# Patient Record
Sex: FEMALE | Race: ASIAN | NOT HISPANIC OR LATINO
[De-identification: names, ages, dates, MRNs, and addresses within clinical notes are randomized per-mention and may not be internally consistent; named-entity substitution may affect disease eponyms.]

---

## 2018-07-17 ENCOUNTER — APPOINTMENT (OUTPATIENT)
Dept: DERMATOLOGY | Facility: CLINIC | Age: 68
End: 2018-07-17
Payer: MEDICARE

## 2018-07-17 VITALS
HEIGHT: 62 IN | DIASTOLIC BLOOD PRESSURE: 80 MMHG | SYSTOLIC BLOOD PRESSURE: 122 MMHG | BODY MASS INDEX: 20.24 KG/M2 | WEIGHT: 110 LBS

## 2018-07-17 DIAGNOSIS — L81.1 CHLOASMA: ICD-10-CM

## 2018-07-17 PROCEDURE — 99203 OFFICE O/P NEW LOW 30 MIN: CPT

## 2020-04-26 ENCOUNTER — MESSAGE (OUTPATIENT)
Age: 70
End: 2020-04-26

## 2021-03-10 ENCOUNTER — APPOINTMENT (OUTPATIENT)
Dept: INTERNAL MEDICINE | Facility: CLINIC | Age: 71
End: 2021-03-10
Payer: COMMERCIAL

## 2021-03-10 VITALS
SYSTOLIC BLOOD PRESSURE: 126 MMHG | TEMPERATURE: 97.8 F | BODY MASS INDEX: 20.98 KG/M2 | RESPIRATION RATE: 14 BRPM | HEIGHT: 62 IN | OXYGEN SATURATION: 98 % | HEART RATE: 81 BPM | WEIGHT: 114 LBS | DIASTOLIC BLOOD PRESSURE: 70 MMHG

## 2021-03-10 DIAGNOSIS — Z83.6 FAMILY HISTORY OF OTHER DISEASES OF THE RESPIRATORY SYSTEM: ICD-10-CM

## 2021-03-10 DIAGNOSIS — Z82.49 FAMILY HISTORY OF ISCHEMIC HEART DISEASE AND OTHER DISEASES OF THE CIRCULATORY SYSTEM: ICD-10-CM

## 2021-03-10 DIAGNOSIS — H40.20X0 UNSPECIFIED PRIMARY ANGLE-CLOSURE GLAUCOMA, STAGE UNSPECIFIED: ICD-10-CM

## 2021-03-10 DIAGNOSIS — Z23 ENCOUNTER FOR IMMUNIZATION: ICD-10-CM

## 2021-03-10 DIAGNOSIS — Z22.7 LATENT TUBERCULOSIS: ICD-10-CM

## 2021-03-10 DIAGNOSIS — Z78.9 OTHER SPECIFIED HEALTH STATUS: ICD-10-CM

## 2021-03-10 DIAGNOSIS — J30.2 OTHER SEASONAL ALLERGIC RHINITIS: ICD-10-CM

## 2021-03-10 DIAGNOSIS — Z86.2 PERSONAL HISTORY OF DISEASES OF THE BLOOD AND BLOOD-FORMING ORGANS AND CERTAIN DISORDERS INVOLVING THE IMMUNE MECHANISM: ICD-10-CM

## 2021-03-10 DIAGNOSIS — Z91.018 ALLERGY TO OTHER FOODS: ICD-10-CM

## 2021-03-10 PROCEDURE — 99072 ADDL SUPL MATRL&STAF TM PHE: CPT

## 2021-03-10 PROCEDURE — 99387 INIT PM E/M NEW PAT 65+ YRS: CPT | Mod: 25

## 2021-03-10 PROCEDURE — 90715 TDAP VACCINE 7 YRS/> IM: CPT

## 2021-03-10 PROCEDURE — 93000 ELECTROCARDIOGRAM COMPLETE: CPT | Mod: 59

## 2021-03-10 PROCEDURE — 90471 IMMUNIZATION ADMIN: CPT

## 2021-03-10 PROCEDURE — G0444 DEPRESSION SCREEN ANNUAL: CPT | Mod: NC,59

## 2021-03-10 PROCEDURE — 36415 COLL VENOUS BLD VENIPUNCTURE: CPT

## 2021-03-13 PROBLEM — Z86.2 HISTORY OF ANEMIA: Status: RESOLVED | Noted: 2021-03-10 | Resolved: 2021-03-13

## 2021-03-13 PROBLEM — Z82.49 FAMILY HISTORY OF HYPERTENSION: Status: ACTIVE | Noted: 2021-03-13

## 2021-03-13 PROBLEM — Z83.6 FAMILY HISTORY OF PULMONARY FIBROSIS: Status: ACTIVE | Noted: 2021-03-13

## 2021-03-13 PROBLEM — H40.20X0 GLAUCOMA, NARROW-ANGLE: Status: ACTIVE | Noted: 2021-03-13

## 2021-03-13 PROBLEM — J30.2 SEASONAL ALLERGIES: Status: ACTIVE | Noted: 2021-03-10

## 2021-03-13 PROBLEM — Z91.018 FOOD ALLERGY: Status: ACTIVE | Noted: 2021-03-10

## 2021-03-13 PROBLEM — Z22.7 LATENT TUBERCULOSIS: Status: RESOLVED | Noted: 2021-03-13 | Resolved: 2021-03-13

## 2021-03-13 RX ORDER — TIMOLOL MALEATE 5 MG/ML
0.5 SOLUTION OPHTHALMIC
Refills: 0 | Status: ACTIVE | COMMUNITY

## 2021-03-13 RX ORDER — MULTIVITAMIN
TABLET ORAL
Refills: 0 | Status: ACTIVE | COMMUNITY

## 2021-03-13 RX ORDER — VITAMIN K2 90 MCG
125 MCG CAPSULE ORAL
Refills: 0 | Status: ACTIVE | COMMUNITY

## 2021-03-13 RX ORDER — HYDROQUINONE 40 MG/G
4 CREAM TOPICAL
Qty: 1 | Refills: 2 | Status: DISCONTINUED | COMMUNITY
Start: 2018-07-17 | End: 2021-03-13

## 2021-03-13 NOTE — PAST MEDICAL HISTORY
[Menopause Age____] : age at menopause was [unfilled] [Total Preg ___] : G[unfilled] [Living ___] : Living: [unfilled] [AB Spont ___] : miscarriages: [unfilled]  [Postmenopausal] : postmenopausal

## 2021-03-13 NOTE — HEALTH RISK ASSESSMENT
[Patient reported colonoscopy was normal] : Patient reported colonoscopy was normal [Patient reported mammogram was normal] : Patient reported mammogram was normal [Patient reported PAP Smear was normal] : Patient reported PAP Smear was normal [No falls in past year] : Patient reported no falls in the past year [Patient declined bone density test] : Patient declined bone density test [HIV test declined] : HIV test declined [Hepatitis C test offered] : Hepatitis C test offered [Fully functional (bathing, dressing, toileting, transferring, walking, feeding)] : Fully functional (bathing, dressing, toileting, transferring, walking, feeding) [Fully functional (using the telephone, shopping, preparing meals, housekeeping, doing laundry, using] : Fully functional and needs no help or supervision to perform IADLs (using the telephone, shopping, preparing meals, housekeeping, doing laundry, using transportation, managing medications and managing finances) [Smoke Detector] : smoke detector [Carbon Monoxide Detector] : carbon monoxide detector [Seat Belt] :  uses seat belt [Sunscreen] : uses sunscreen [0] : 2) Feeling down, depressed, or hopeless: Not at all (0) [[id number]] : 0 [Guns at Home] : no guns at home [MammogramDate] : 01/2019 [PapSmearDate] : 01/2010 [BoneDensityDate] : > 10 yrs [ColonoscopyDate] : 05/19 [ColonoscopyComments] : told nl, repeat in 2 yrs  [AdvancecareDate] : 03/21

## 2021-03-13 NOTE — ASSESSMENT
[FreeTextEntry1] : \par 72 yo F pmhx HTN, HLD, hypothyroidism, regional ileitis, anemia, hx LTBI s/p INH tx course, food allergy, narrow angle glaucoma here to establish care/CPE\par \par HTN- BP wnl, asx\par -hx negative screening stress test (? 2017) per pt\par -screening EKG: NSR @ 66 bpm, nl axis, no LVH/path Q/ST chgs - to be scanned into chart\par -on enalapril 20 mg qd\par -followed by optho, yearly screening advised\par -low salt diet, exercise advised\par -asked to forward record\par -check cmp, screening UA\par \par HLD-\par -hx d/c Crestor 2/2 muscle weakness, resolved off Rx (denies hx other statins in past)\par -willing to consider alternate statin trial after repeat labs \par -low fast diet, exercise advised\par -check lipids\par \par hypothyroidism-\par -on Levothyroxine 50mcg\par -check TSH\par \par hx right sided regional ileitis, dx'd in 2006- told healed since, on Mesalamine qd\par -followed by GI, Dr. Russo, states awaiting c-cope 5/21 which gets q 2 yrs with EGD per pt\par -asked to forward records\par \par hx narrow angle glaucoma- hx b/l laser surgery ~4 yrs ago\par -followed optho, last seen 3/20- told doing well and has f/u 3/18/21\par -on Timolol gtt\par \par hx food/seasonal allergies, hx cough in cold air- \par -A/I referral for eval and r/o asthma\par -declines epi pen\par -cont to avoid apples/pears/nuts advised\par \par hx +PPD 6/02 s/p INH tx x 6 mo\par -[place]\par -reports last cxr in 2018 for work screening, told showed calcification- seen by pulm, told c/w scarring from prior TB and no intervention needed\par \par \par MISC:  Continued social distancing and measure for covid19 prevention encouraged. \par -hx covid vaccine (pfizer), completed 2/13/21 \par \par \par \par HCM\par -check screening labs; declines STD/HIV screening\par -check hep C screening\par -hx flu shot 10/20\par -Tdap booster today\par -hx PVX, plans to forward record to verify date\par -hx hep B series\par -immune to MMR/Varicella and hep B per 6/02 labs- to be scanned into chart\par -hx Shingrix #1 in 2/21, plans to get #2 with EHS\par -hx negative PAP 2010 by GYN per pt, referral given per request\par -hx negative mammo 2019, Rx for repeat given\par -hx nl DEXA > 10 yrs ago per pt, declines repeat \par -yearly derm skin screening exam advised.  Regular use of sun block for skin cancer prevention advised.\par -yearly dental screening advised\par -advised to designate HCP and provide copy of completed form for records\par \par Pt's cell: [phone]\par \par Labs drawn in office today.\par

## 2021-03-13 NOTE — PHYSICAL EXAM
[No Acute Distress] : no acute distress [Well-Appearing] : well-appearing [Normal Sclera/Conjunctiva] : normal sclera/conjunctiva [PERRL] : pupils equal round and reactive to light [EOMI] : extraocular movements intact [Normal Outer Ear/Nose] : the outer ears and nose were normal in appearance [Normal Oropharynx] : the oropharynx was normal [Normal TMs] : both tympanic membranes were normal [Normal Nasal Mucosa] : the nasal mucosa was normal [No Lymphadenopathy] : no lymphadenopathy [Supple] : supple [Thyroid Normal, No Nodules] : the thyroid was normal and there were no nodules present [No Respiratory Distress] : no respiratory distress  [Clear to Auscultation] : lungs were clear to auscultation bilaterally [Normal Rate] : normal rate  [Regular Rhythm] : with a regular rhythm [Normal S1, S2] : normal S1 and S2 [No Murmur] : no murmur heard [Pedal Pulses Present] : the pedal pulses are present [No Edema] : there was no peripheral edema [Soft] : abdomen soft [Non Tender] : non-tender [No HSM] : no HSM [Normal Supraclavicular Nodes] : no supraclavicular lymphadenopathy [Normal Posterior Cervical Nodes] : no posterior cervical lymphadenopathy [Normal Anterior Cervical Nodes] : no anterior cervical lymphadenopathy [No CVA Tenderness] : no CVA  tenderness [No Spinal Tenderness] : no spinal tenderness [No Joint Swelling] : no joint swelling [Grossly Normal Strength/Tone] : grossly normal strength/tone [No Rash] : no rash [No Focal Deficits] : no focal deficits [Normal Gait] : normal gait [Normal Affect] : the affect was normal [Alert and Oriented x3] : oriented to person, place, and time [de-identified] : scattered freckles

## 2021-03-13 NOTE — HISTORY OF PRESENT ILLNESS
[Health Maintenance] : health maintenance [Spouse] : spouse [] :  [Working Part-Time] : working part-time [Occupation ___] : occupation: [unfilled] [Never Smoked Cigarettes] : has never smoked cigarettes [Never] : has never used illicit drugs [Good] : good [Reg. Dental Visits] : She has regular dental visits [Postmenopausal] : the patient is postmenopausal [Healthy Diet] : She consumes a diverse and healthy diet [Regular Exercise] : She exercises regularly [FreeTextEntry1] : \par Needs PMD. [Vision Problems] : She denies vision problems [Hearing Loss] : She denies hearing loss [de-identified] : 2019 by prior PMD [de-identified] : hx flu shot 10/20, hx Tdap 2011, hx PVX - unsure of date received, hx hep B series, hx Zostavax 2016, hx shingrix #1 in 2/21 [de-identified] : \par Feeling well.\par Last at 4 hrs ago- steamed eleazar, wants labs today\par \par \par Denies ER or hospitalizations since last CPE.\par Reports is socially distancing and using precautions for covid prevention.\par Denies sick or covid positive contacts.\par Denies fever, chills, cough or sob.\par -hx covid vaccine (pfizer), completed 2/13/21\par \par hx food allergy- avoids apples/pears/nuts\par -has coughing on/off when in cold air, using MDI prn with help- used 2x/mo.  Denies hx pulm eval for this or PFTs to r/o asthma.\par -hx idiopathic pulmonary fibrosis in mother\par -denies hx epi-pen use, none at home- declines Rx\par \par hx right sided regional ileitis, dx'd in 2006- told healed since, on Mesalamine qd\par -followed by GI, Dr. Russo, states awaiting c-cope 5/21 which gets q 2 yrs with EGD \par \par HTN- dx'd in 1995\par -on enalapril 20 mg qd, taking this dose x 2 yrs\par -denies HA, dizziness, CP or sob\par \par HLD-\par -reports hx Crestor 10 mg qd per PMD (also cardiologist) taken since 2019- states self d/c'd 3 mo ago due to muscle weakness was feeling during yoga- notes resolved since off\par -denies hx prior statin use, hx CAD or CVA\par -reports hx negative screening stress test in past (? 4 yrs ago)\par \par Reports is vegetarian, eating healthy.  Notes stable weight in past year.\par exercise: yoga daily \par \par hypothyroidism- dx'd on routine labs 5 yrs ago\par -on levothyroxine 50 mcg, taking this dose since dx\par -last TSH checked 2019, states calls in own refills prn\par \par hx narrow angle glaucoma- hx b/l laser surgery ~4 yrs ago\par -followed optho, last seen 3/20- told doing well and has f/u 3/18/21\par -on Timolol gtt\par -denies eye pain or vision trouble\par \par hx +PPD 6/02 s/p INH tx  x 6 mo\par -[place]\par -reports last cxr in 2018 for work screening, told showed calcification- seen by pulm, told c/w scarring from prior TB and no intervention needed\par \par Denies Depression or anxiety.

## 2021-03-14 LAB
25(OH)D3 SERPL-MCNC: 38.3 NG/ML
ALBUMIN SERPL ELPH-MCNC: 4.5 G/DL
ALP BLD-CCNC: 43 U/L
ALT SERPL-CCNC: 14 U/L
ANION GAP SERPL CALC-SCNC: 10 MMOL/L
APPEARANCE: CLEAR
AST SERPL-CCNC: 20 U/L
BACTERIA: NEGATIVE
BASOPHILS # BLD AUTO: 0.06 K/UL
BASOPHILS NFR BLD AUTO: 0.8 %
BILIRUB SERPL-MCNC: 0.2 MG/DL
BILIRUBIN URINE: NEGATIVE
BLOOD URINE: NEGATIVE
BUN SERPL-MCNC: 19 MG/DL
CALCIUM SERPL-MCNC: 10.2 MG/DL
CHLORIDE SERPL-SCNC: 101 MMOL/L
CHOLEST SERPL-MCNC: 226 MG/DL
CO2 SERPL-SCNC: 27 MMOL/L
COLOR: YELLOW
CREAT SERPL-MCNC: 0.78 MG/DL
CREAT SPEC-SCNC: 91 MG/DL
CREAT/PROT UR: 0.1 RATIO
EOSINOPHIL # BLD AUTO: 0.31 K/UL
EOSINOPHIL NFR BLD AUTO: 4 %
ESTIMATED AVERAGE GLUCOSE: 117 MG/DL
FOLATE SERPL-MCNC: >20 NG/ML
GLUCOSE QUALITATIVE U: NEGATIVE
GLUCOSE SERPL-MCNC: 89 MG/DL
HBA1C MFR BLD HPLC: 5.7 %
HBV CORE IGG+IGM SER QL: NONREACTIVE
HBV SURFACE AB SER QL: NONREACTIVE
HBV SURFACE AG SER QL: NONREACTIVE
HCT VFR BLD CALC: 38.8 %
HCV AB SER QL: NONREACTIVE
HCV S/CO RATIO: 0.08 S/CO
HDLC SERPL-MCNC: 58 MG/DL
HGB BLD-MCNC: 12.5 G/DL
HYALINE CASTS: 0 /LPF
IMM GRANULOCYTES NFR BLD AUTO: 0.3 %
KETONES URINE: NEGATIVE
LDLC SERPL CALC-MCNC: 139 MG/DL
LEUKOCYTE ESTERASE URINE: NEGATIVE
LYMPHOCYTES # BLD AUTO: 3.27 K/UL
LYMPHOCYTES NFR BLD AUTO: 42 %
MAN DIFF?: NORMAL
MCHC RBC-ENTMCNC: 30.3 PG
MCHC RBC-ENTMCNC: 32.2 GM/DL
MCV RBC AUTO: 94.2 FL
MICROSCOPIC-UA: NORMAL
MONOCYTES # BLD AUTO: 0.6 K/UL
MONOCYTES NFR BLD AUTO: 7.7 %
NEUTROPHILS # BLD AUTO: 3.53 K/UL
NEUTROPHILS NFR BLD AUTO: 45.2 %
NITRITE URINE: NEGATIVE
NONHDLC SERPL-MCNC: 168 MG/DL
PH URINE: 7.5
PLATELET # BLD AUTO: 343 K/UL
POTASSIUM SERPL-SCNC: 4.2 MMOL/L
PROT SERPL-MCNC: 7.2 G/DL
PROT UR-MCNC: 6 MG/DL
PROTEIN URINE: NORMAL
RBC # BLD: 4.12 M/UL
RBC # FLD: 12 %
RED BLOOD CELLS URINE: 2 /HPF
SODIUM SERPL-SCNC: 138 MMOL/L
SPECIFIC GRAVITY URINE: 1.02
SQUAMOUS EPITHELIAL CELLS: 2 /HPF
TRIGL SERPL-MCNC: 145 MG/DL
TSH SERPL-ACNC: 1.35 UIU/ML
UROBILINOGEN URINE: NORMAL
VIT B12 SERPL-MCNC: 608 PG/ML
WBC # FLD AUTO: 7.79 K/UL
WHITE BLOOD CELLS URINE: 4 /HPF

## 2021-04-08 ENCOUNTER — NON-APPOINTMENT (OUTPATIENT)
Age: 71
End: 2021-04-08

## 2021-04-08 DIAGNOSIS — R92.8 OTHER ABNORMAL AND INCONCLUSIVE FINDINGS ON DIAGNOSTIC IMAGING OF BREAST: ICD-10-CM

## 2021-04-19 ENCOUNTER — NON-APPOINTMENT (OUTPATIENT)
Age: 71
End: 2021-04-19

## 2021-06-11 ENCOUNTER — APPOINTMENT (OUTPATIENT)
Dept: INTERNAL MEDICINE | Facility: CLINIC | Age: 71
End: 2021-06-11

## 2021-07-22 ENCOUNTER — APPOINTMENT (OUTPATIENT)
Dept: PULMONOLOGY | Facility: CLINIC | Age: 71
End: 2021-07-22
Payer: MEDICARE

## 2021-07-22 VITALS
RESPIRATION RATE: 16 BRPM | WEIGHT: 114 LBS | DIASTOLIC BLOOD PRESSURE: 84 MMHG | TEMPERATURE: 97.9 F | SYSTOLIC BLOOD PRESSURE: 132 MMHG | HEIGHT: 62 IN | HEART RATE: 70 BPM | BODY MASS INDEX: 20.98 KG/M2

## 2021-07-22 PROCEDURE — 99205 OFFICE O/P NEW HI 60 MIN: CPT

## 2021-07-22 PROCEDURE — 99072 ADDL SUPL MATRL&STAF TM PHE: CPT

## 2021-07-22 NOTE — HISTORY OF PRESENT ILLNESS
[FreeTextEntry1] : 70 y/o F with PMH of HTN, HLD and hypothyroidism presented to the sleep clinic for an initial evaluation.\par \par Her main complaints is nocturnal awakenings and non-restorative sleep.  SHe is post-menopausal and noted her sleep issues began after menopause.  Notes three awakenings at night.  Dry mouth in the mornings.  Feels unrefreshed and has some residual EDS with an ESS of 6 on today's exam.  Denies significant changes to her weight. \par ____________________________________________________________________\par EPWORTH SLEEPINESS SCALE\par \par How likely are you to doze off or fall asleep in the situations described below, in contrast to feeling just tired?  \par This refers to your usual way of life in recent times.  \par Even if you haven't done some of these things recently, try to work out how they would have affected you.\par Use the following scale to choose one most appropriate number for each situation.\par \par Chance of dozing.............Situation\par .............2...........................Sitting and reading\par .............0...........................Watching TV\par .............0...........................Sitting inactive in a public place (eg a theatre or a meeting)\par .............2...........................As a passenger in a car for an hour without a break\par .............2...........................Lying down to rest in the afternoon when circumstances permit\par .............0...........................Sitting and talking to someone\par .............0...........................Sitting quietly after lunch without alcohol\par .............0...........................In a car, while stopped for a few minutes in traffic\par \par .............6...........................TOTAL SCORE\par \par 0 = Never would doze\par 1 = Slight chance of dozing\par 2 = Moderate chance of dozing\par 3 = High chance of dozing \par ______________________________________________________________________  [Snoring] : snoring [Frequent Nocturnal Awakening] : frequent nocturnal awakening [Unintentional Sleep While Inactive] : unintentional sleep while inactive [Awakes Unrefreshed] : awakening unrefreshed [Awakening With Dry Mouth] : awakening with dry mouth [Recent  Weight Gain] : no recent weight gain [ESS] : 6

## 2021-07-22 NOTE — REVIEW OF SYSTEMS
[EDS: ESS=____] : daytime somnolence: ESS=[unfilled] [Fatigue] : fatigue [A.M. Dry Mouth] : a.m. dry mouth [Frequent Nocturnal Awakenings] : frequent nocturnal awakenings from sleep [Daytime Somnolence: ESS=____] : daytime somnolence: ESS=[unfilled] [Unintentional Sleep while inactive] : unintentional sleep while inactive [Awakes Unrefreshed] : nonrestorative sleep [Awakes With Dry Mouth] : awakes with dry mouth [Negative] : Psychiatric

## 2021-07-22 NOTE — ASSESSMENT
[FreeTextEntry1] : 70 y/o F with PMH of HTN, HLD and hypothyroidism presented to the sleep clinic for an initial evaluation.  Her main sleep related complaint is nocturnal awakenings and non-restorative sleep.  She noted her sleep related issues occurred after menopause. She has around three awakenings at night.  Dry mouth in the mornings.  Feels unrefreshed and has some residual EDS with an ESS of 6 on today's exam.  Denies significant changes to her weight.   On exam, her oropharynx is crowded due to an enlarged base of tongue and low lying soft palate -- all of which increase risk of DAGMAR.  The patient was referred to the [facility] for diagnostic polysomnography for further assessment. The ramifications of obstructive sleep apnea and its potential therapeutic modalities were discussed with the patient. The dangers of drowsy driving were discussed with the patient.  The patient was warned to avoid drowsy driving. The patient will followup after results of this study are available.\par  \par Gurpreet Cheney, DO\par Pulmonary, Critical Care and Sleep Medicine Attending

## 2021-08-10 ENCOUNTER — APPOINTMENT (OUTPATIENT)
Dept: INTERNAL MEDICINE | Facility: CLINIC | Age: 71
End: 2021-08-10
Payer: MEDICARE

## 2021-08-10 VITALS
HEIGHT: 62 IN | SYSTOLIC BLOOD PRESSURE: 114 MMHG | BODY MASS INDEX: 20.61 KG/M2 | DIASTOLIC BLOOD PRESSURE: 72 MMHG | HEART RATE: 82 BPM | TEMPERATURE: 98.5 F | OXYGEN SATURATION: 98 % | WEIGHT: 112 LBS

## 2021-08-10 DIAGNOSIS — K50.00 CROHN'S DISEASE OF SMALL INTESTINE W/OUT COMPLICATIONS: ICD-10-CM

## 2021-08-10 PROCEDURE — 99214 OFFICE O/P EST MOD 30 MIN: CPT

## 2021-08-10 NOTE — ASSESSMENT
[FreeTextEntry1] : 70 yo F pmhx HTN, HLD, hypothyroidism, regional ileitis, anemia, hx LTBI s/p INH tx course, food allergy, narrow angle glaucoma here for f/u on ch medical conditions \par \par Insomnia\par - sleep hygiene reviewed \par -avoid electronics after 9 pm \par -limit caffein intake in evenings \par - could not tolerate melatonin \par -trial of ambien 5 mg qhs as needed only \par \par HTN- BP wnl, asx\par -hx negative screening stress test (? 2017) per pt\par -screening EKG: NSR @ 66 bpm, nl axis, no LVH/path Q/ST chgs - to be scanned into chart\par -on enalapril 20 mg qd\par -followed by optho, yearly screening advised\par -low salt diet, exercise advised\par -asked to forward record\par -check cmp, screening UA\par \par HLD-\par -on crestor 10 qhs \par -willing to consider alternate statin trial after repeat labs \par -low fast diet, exercise advised\par -check lipids fasting \par \par hypothyroidism-\par -on Levothyroxine 50mcg\par -check TSH\par \par hx right sided regional ileitis, dx'd in 2006- told healed since, on Mesalamine qd\par -followed by GI, Dr. 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 \par -hx covid vaccine (pfizer), completed 2/13/21 \par \par \par HCM\par declines STD/HIV screening\par  hep C screening 3/2021 neg \par  flu shot- 10/20\par Tdap booster- 3/2021 \par hx PVX, plans to forward record to verify date\par hx hep B series in past \par immune to MMR/Varicella and hep B per 6/02 labs- to be scanned into chart\par hx Shingrix #1 in 2/21, plans to get #2 with EHS\par hx negative PAP 2010 by GYN per pt, referral given per request\par mammo 4/6/21 bi rad 1 \par hx nl DEXA > 10 yrs ago per pt, declines repeat \par yearly derm skin screening exam advised. Regular use of sun block for skin cancer prevention advised.\par yearly dental screening advised\par advised to designate HCP and provide copy of completed form for records\par colonoscope 2021 - no polyps - hx colitis - recommended to stop mesalamine \par \par Pt's cell: [phone]\par \par  \par

## 2021-08-10 NOTE — HISTORY OF PRESENT ILLNESS
[de-identified] : transition of care from DR Tavarez - last visit 3/10/21 \par seen today for f/u on ch medical issues \par \par Working in [employer] part time [employer] \par \par c/o insomnia- saw sleep sp - recommended to do sleep specialist - \par -goes to bed 10:30 wakes up 1 :30 then restless sleep \par -took melatonin in past for 1 month dev abd discomfort so stopped \par \par hx food allergy- avoids apples/pears/nuts\par -has coughing on/off when in cold air, using MDI prn with help- used 2x/mo. Denies hx pulm eval for this or PFTs to r/o asthma.\par -hx idiopathic pulmonary fibrosis in mother\par -denies hx epi-pen use, none at home- declines Rx\par \par hx right sided regional ileitis, dx'd in 2006- told healed since, on Mesalamine qd\par -followed by GI, Dr. Russo, did c-cope 5/21 which gets q 2 yrs with EGD - recommended - to stop Mesalamine \par \par HTN- dx'd in 1995\par -on enalapril 20 mg qd, taking this dose x 2 yrs\par -denies HA, dizziness, CP or sob\par \par HLD-\par -reports hx Crestor 10 mg qd per PMD (also cardiologist) taken since 2019\par -denies hx prior statin use, hx CAD or CVA\par -reports hx negative screening stress test in past (? 4 yrs ago)\par \par Reports is vegetarian, eating healthy. 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 \par

## 2021-09-07 ENCOUNTER — NON-APPOINTMENT (OUTPATIENT)
Age: 71
End: 2021-09-07

## 2021-12-30 ENCOUNTER — APPOINTMENT (OUTPATIENT)
Dept: SLEEP CENTER | Facility: CLINIC | Age: 71
End: 2021-12-30

## 2022-02-17 ENCOUNTER — FORM ENCOUNTER (OUTPATIENT)
Age: 72
End: 2022-02-17

## 2022-02-18 ENCOUNTER — OUTPATIENT (OUTPATIENT)
Dept: OUTPATIENT SERVICES | Facility: HOSPITAL | Age: 72
LOS: 1 days | End: 2022-02-18
Payer: MEDICARE

## 2022-02-18 ENCOUNTER — APPOINTMENT (OUTPATIENT)
Dept: SLEEP CENTER | Facility: CLINIC | Age: 72
End: 2022-02-18
Payer: MEDICARE

## 2022-02-18 DIAGNOSIS — Z98.89 OTHER SPECIFIED POSTPROCEDURAL STATES: Chronic | ICD-10-CM

## 2022-02-18 PROCEDURE — 95806 SLEEP STUDY UNATT&RESP EFFT: CPT | Mod: 26

## 2022-02-18 PROCEDURE — 95806 SLEEP STUDY UNATT&RESP EFFT: CPT

## 2022-02-23 LAB
CHOLEST SERPL-MCNC: 260 MG/DL
HDLC SERPL-MCNC: 68 MG/DL
LDLC SERPL CALC-MCNC: 161 MG/DL
NONHDLC SERPL-MCNC: 192 MG/DL
TRIGL SERPL-MCNC: 157 MG/DL

## 2022-03-04 ENCOUNTER — APPOINTMENT (OUTPATIENT)
Dept: PULMONOLOGY | Facility: CLINIC | Age: 72
End: 2022-03-04
Payer: MEDICARE

## 2022-03-04 PROCEDURE — 99213 OFFICE O/P EST LOW 20 MIN: CPT | Mod: 95

## 2022-03-04 NOTE — ASSESSMENT
[FreeTextEntry1] : 70 y/o F with PMH of HTN, HLD and hypothyroidism  logged into his telehealth encounter as a follow up visit. \par \par She was first seen on 7/22/2021 and the main complaint was nocturnal awakenings and non-restorative sleep.  She was sent for an HSAT which showed an TRACE of 7.0/hr with a T-90% of 0.1%.  She notes that she mostly sleeps on her stomach but did mentions having to sleep on her sides and back for the study.  Given that she only has mild DAGMAR with minimal nocturnal hypoxemia, there is no indication for therapy at this time.  However, if she is symptomatic, she may purse therapy.  The ramifications of obstructive sleep apnea and its potential therapeutic modalities were discussed with the patient.  Discussed positional therapy, OAT and PAP therapy.  She will think about it and let me know.  All questions answered and patient understood.\par \par The dangers of drowsy driving were discussed with the patient.  The patient was warned to avoid drowsy driving. \par \par Gurpreet Cheney, DO\par Pulmonary, Critical Care and Sleep Medicine Attending \par \par

## 2022-03-04 NOTE — HISTORY OF PRESENT ILLNESS
[Date: ___] : Date of most recent diagnostic polysomnogram: [unfilled] [AHI: ___ per hour] : Apnea-hypopnea index:  [unfilled] per hour [T90%: ___] : T90%: [unfilled]% [FreeTextEntry1] : 72 y/o F with PMH of HTN, HLD and hypothyroidism  logged into his telehealth encounter as a follow up visit. \par \par She was first seen on 7/22/2021 and the main complaint was nocturnal awakenings and non-restorative sleep.  She was sent for an HSAT which showed an TRACE of 7.0/hr with a T-90% of 0.1%.  She notes that she mostly sleeps on her stomach but during the study she slept mostly on her back and sides.  She is interested in discussing treatment options.

## 2022-03-04 NOTE — REASON FOR VISIT
[Home] : at home, [unfilled] , at the time of the visit. [Medical Office: ([facility])___] : at the medical office located in  [Verbal consent obtained from patient] : the patient, [unfilled]

## 2022-03-11 ENCOUNTER — APPOINTMENT (OUTPATIENT)
Dept: INTERNAL MEDICINE | Facility: CLINIC | Age: 72
End: 2022-03-11
Payer: MEDICARE

## 2022-03-11 VITALS
SYSTOLIC BLOOD PRESSURE: 116 MMHG | HEIGHT: 62 IN | DIASTOLIC BLOOD PRESSURE: 70 MMHG | TEMPERATURE: 98.6 F | WEIGHT: 114 LBS | OXYGEN SATURATION: 98 % | HEART RATE: 74 BPM | BODY MASS INDEX: 20.98 KG/M2

## 2022-03-11 DIAGNOSIS — G47.33 OBSTRUCTIVE SLEEP APNEA (ADULT) (PEDIATRIC): ICD-10-CM

## 2022-03-11 DIAGNOSIS — Z23 ENCOUNTER FOR IMMUNIZATION: ICD-10-CM

## 2022-03-11 PROCEDURE — 90677 PCV20 VACCINE IM: CPT

## 2022-03-11 PROCEDURE — G0439: CPT

## 2022-03-11 PROCEDURE — G0442 ANNUAL ALCOHOL SCREEN 15 MIN: CPT | Mod: 59

## 2022-03-11 PROCEDURE — G0444 DEPRESSION SCREEN ANNUAL: CPT

## 2022-03-11 PROCEDURE — G0009: CPT

## 2022-03-11 NOTE — HISTORY OF PRESENT ILLNESS
[de-identified] : seen for AWV \par \par Working in [employer] part time [employer] \par \par c/o insomnia- saw sleep sp - recommended to do sleep specialist - \par -goes to bed 10:30 wakes up 1 :30 then restless sleep \par -took melatonin in past for 1 month dev abd discomfort so stopped \par \par hx food allergy- avoids apples/pears/nuts\par -has coughing on/off when in cold air, using MDI prn with help- used 2x/mo. Denies hx pulm eval for this or PFTs to r/o asthma.\par -hx idiopathic pulmonary fibrosis in mother\par -denies hx epi-pen use, none at home- declines Rx\par \par hx right sided regional ileitis, dx'd in 2006- told healed since, on Mesalamine qd\par -followed by GI, Dr. 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 4 yrs ago)\par \par Reports is vegetarian, eating healthy. 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 \par  \par

## 2022-03-11 NOTE — ASSESSMENT
[FreeTextEntry1] : 73 yo F pmhx HTN, HLD, hypothyroidism, regional ileitis, anemia, hx LTBI s/p INH tx course, food allergy, narrow angle glaucoma here for AWV \par \par saw sleep sp did Sleep study dx as Mild DAGMAR \par \par Insomnia\par - sleep hygiene reviewed \par -avoid electronics after 9 pm \par -limit caffein intake in evenings \par - could not tolerate melatonin \par -trial of Ambien 5 mg qhs as needed only \par \par HTN- BP wnl, asx\par -hx negative screening stress test (? 2017) per pt\par -screening EKG: NSR @ 66 bpm, nl axis, no LVH/path Q/ST chgs - to be scanned into chart\par -on enalapril 20 mg qd\par -followed by optho, yearly screening advised\par -low salt diet, exercise advised\par -asked to forward record\par -check cmp, screening UA\par \par HLD-ASCVD risk 11.6 % \par -on crestor 10 qhs - C/o myopathy get CPK \par -willing to consider alternate statin trial after repeat labs \par -low fast diet, exercise advised\par -check lipids fasting \par \par hypothyroidism-\par -on Levothyroxine 50mcg\par -check TSH\par \par hx right sided regional ileitis, dx'd in 2006- told healed since, on Mesalamine qd\par -followed by GI, Dr. Russo, s/p c-cope 5/21 which gets q 2 yrs with EGD per pt- was taken off Mesalamine \par -asked to forward records\par \par hx narrow angle glaucoma- hx b/l laser surgery ~4 yrs ago\par -followed optho, last seen  3/18/21- has f/u 3/2022 \par -on Timolol gtt\par \par hx food/seasonal allergies, hx cough in cold air- \par -A/I referral for eval and r/o asthma\par -declines epi pen\par -cont to avoid apples/pears/nuts advised\par \par hx +PPD 6/02 s/p INH tx x 6 mo\par -[place]\par -reports last cxr in 2018 for work screening, told showed calcification- seen by pulm, told c/w scarring from prior TB and no intervention needed\par \par -hx covid vaccine (pfizer), completed 2/13/21 booster - 12/2021 \par \par HCM\par  hep C screening 3/2021 neg \par  flu shot- 11/2021 \par Tdap booster- 3/2021 \par Prevnar 20 3/11/22 \par hx hep B series in past \par immune to MMR/Varicella and hep B per 6/02 labs- to be scanned into chart\par hx Shingrix #1 in 2/21, plans to get #2 with EHS\par hx negative PAP 2010 by GYN per pt, referral given per request\par mammo 4/6/21 bi rad 1 \par hx nl DEXA > 10 yrs ago per pt, declines repeat \par yearly derm skin screening exam advised. Regular use of sun block for skin cancer prevention advised.\par yearly dental screening advised\par advised to designate HCP and provide copy of completed form for records\par colonoscope 2021 - no polyps - hx colitis - recommended to stop mesalamine - due 2023 \par \par Pt's cell: [phone]\par \par will do BW fasting in 4/2022 \par

## 2022-03-11 NOTE — HEALTH RISK ASSESSMENT
[Good] : ~his/her~  mood as  good [Never] : Never [No] : No [No falls in past year] : Patient reported no falls in the past year [0] : 2) Feeling down, depressed, or hopeless: Not at all (0) [PHQ-2 Negative - No further assessment needed] : PHQ-2 Negative - No further assessment needed [Patient reported PAP Smear was normal] : Patient reported PAP Smear was normal [Patient reported bone density results were abnormal] : Patient reported bone density results were abnormal [With Significant Other] : lives with significant other [Employed] : employed [] :  [With Patient/Caregiver] : , with patient/caregiver [Designated Healthcare Proxy] : Designated healthcare proxy [Name: ___] : Health Care Proxy's Name: [unfilled]  [Relationship: ___] : Relationship: [unfilled] [de-identified] : Yoga , walkinh 3-4 days  [[id number]] : 0 [Reports changes in hearing] : Reports no changes in hearing [Reports changes in vision] : Reports no changes in vision [Reports changes in dental health] : Reports no changes in dental health [PapSmearDate] : 2/2022 [BoneDensityDate] : 2/2022 [AdvancecareDate] : 3/11/22

## 2022-04-14 ENCOUNTER — NON-APPOINTMENT (OUTPATIENT)
Age: 72
End: 2022-04-14

## 2022-04-14 ENCOUNTER — APPOINTMENT (OUTPATIENT)
Dept: CARDIOLOGY | Facility: CLINIC | Age: 72
End: 2022-04-14
Payer: MEDICARE

## 2022-04-14 VITALS — SYSTOLIC BLOOD PRESSURE: 126 MMHG | DIASTOLIC BLOOD PRESSURE: 80 MMHG

## 2022-04-14 VITALS
OXYGEN SATURATION: 98 % | WEIGHT: 114 LBS | HEIGHT: 62 IN | SYSTOLIC BLOOD PRESSURE: 122 MMHG | HEART RATE: 73 BPM | RESPIRATION RATE: 17 BRPM | BODY MASS INDEX: 20.98 KG/M2 | DIASTOLIC BLOOD PRESSURE: 86 MMHG

## 2022-04-14 PROCEDURE — 99204 OFFICE O/P NEW MOD 45 MIN: CPT

## 2022-04-14 PROCEDURE — 93000 ELECTROCARDIOGRAM COMPLETE: CPT

## 2022-04-14 NOTE — REVIEW OF SYSTEMS
[Dyspnea on exertion] : not dyspnea during exertion [Chest Discomfort] : no chest discomfort [Negative] : Heme/Lymph

## 2022-04-14 NOTE — HISTORY OF PRESENT ILLNESS
[FreeTextEntry1] : Dear Dr. Farias,\par Thank you for referring her for cardiovascular relation.  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  She reports feeling well overall and is able to go about her usual activities including yoga and walking without any difficulty.  She denies any exertional chest pains or shortness of breath.  She has no episodes of lightheadedness, dizziness or syncope.\par She has had difficulty tolerating her statins in the past and atorvastatin has caused overwhelming fatigue, rosuvastatin has caused muscle aches that do not allow her to perform her usual yoga.  She was recently restarted on rosuvastatin with co-Q10 and seems to be tolerating it fairly well.  She is scheduled to have blood work in the near future.\par She was recently told of a family history of hypertrophic cardiomyopathy.  Her brother and sister had echo that showed LVH as well as confirmatory MRIs.\par She had an echocardiogram dated December 11, 2019 that reported concentric hypertrophy of the left ventricle with an interventricular systolic diameter of 1.3 cm.\par Her past medical history is otherwise notable for resolved ileitis remote treatment for latent TB.  And recent migraines with left visual disturbance.\par She has no history of coronary artery disease, diabetes mellitus, smoking and does have carotid atherosclerosis based on recent ultrasound study.\par

## 2022-04-14 NOTE — DISCUSSION/SUMMARY
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  If significant LVH is present I would proceed with a cardiac MRI in order to exclude an infiltrative cardiomyopathy as the source of her symptoms.\par CAD primary prevention: Her LDL was indeed too high for her cardiovascular risk and I agree with statin therapy.  If she is unable to tolerate daily rosuvastatin I would consider every other day dosing.  Alternatives would also include pravastatin.\par Repeat LDL and CPK pending.\par We discussed the benefits and risks of aspirin therapy.  She does have enough risk factors to suggest low-dose aspirin therapy, however she is very concerned about the possible negative effects as her  had a massive GI bleed related to this.  For the time being I would maximize her LDL lowering agents and consider adding baby aspirin at some future point.\par

## 2022-04-18 ENCOUNTER — LABORATORY RESULT (OUTPATIENT)
Age: 72
End: 2022-04-18

## 2022-04-19 LAB
25(OH)D3 SERPL-MCNC: 36.8 NG/ML
ALBUMIN SERPL ELPH-MCNC: 4.6 G/DL
ALP BLD-CCNC: 37 U/L
ALT SERPL-CCNC: 14 U/L
ANION GAP SERPL CALC-SCNC: 10 MMOL/L
APPEARANCE: CLEAR
AST SERPL-CCNC: 18 U/L
BASOPHILS # BLD AUTO: 0.08 K/UL
BASOPHILS NFR BLD AUTO: 1.3 %
BILIRUB SERPL-MCNC: 0.5 MG/DL
BILIRUBIN URINE: NEGATIVE
BLOOD URINE: NEGATIVE
BUN SERPL-MCNC: 13 MG/DL
CALCIUM SERPL-MCNC: 9.8 MG/DL
CHLORIDE SERPL-SCNC: 103 MMOL/L
CHOLEST SERPL-MCNC: 142 MG/DL
CK SERPL-CCNC: 88 U/L
CO2 SERPL-SCNC: 27 MMOL/L
COLOR: NORMAL
CREAT SERPL-MCNC: 0.75 MG/DL
EGFR: 85 ML/MIN/1.73M2
EOSINOPHIL # BLD AUTO: 0.22 K/UL
EOSINOPHIL NFR BLD AUTO: 3.6 %
ESTIMATED AVERAGE GLUCOSE: 123 MG/DL
GLUCOSE QUALITATIVE U: NEGATIVE
GLUCOSE SERPL-MCNC: 93 MG/DL
HBA1C MFR BLD HPLC: 5.9 %
HCT VFR BLD CALC: 36.8 %
HDLC SERPL-MCNC: 69 MG/DL
HGB BLD-MCNC: 12.1 G/DL
IMM GRANULOCYTES NFR BLD AUTO: 0.2 %
KETONES URINE: NEGATIVE
LDLC SERPL CALC-MCNC: 56 MG/DL
LEUKOCYTE ESTERASE URINE: ABNORMAL
LYMPHOCYTES # BLD AUTO: 2.49 K/UL
LYMPHOCYTES NFR BLD AUTO: 41.3 %
MAN DIFF?: NORMAL
MCHC RBC-ENTMCNC: 30.2 PG
MCHC RBC-ENTMCNC: 32.9 GM/DL
MCV RBC AUTO: 91.8 FL
MONOCYTES # BLD AUTO: 0.53 K/UL
MONOCYTES NFR BLD AUTO: 8.8 %
NEUTROPHILS # BLD AUTO: 2.7 K/UL
NEUTROPHILS NFR BLD AUTO: 44.8 %
NITRITE URINE: NEGATIVE
NONHDLC SERPL-MCNC: 73 MG/DL
PH URINE: 6
PLATELET # BLD AUTO: 299 K/UL
POTASSIUM SERPL-SCNC: 5.1 MMOL/L
PROT SERPL-MCNC: 6.9 G/DL
PROTEIN URINE: NEGATIVE
RBC # BLD: 4.01 M/UL
RBC # FLD: 12 %
SODIUM SERPL-SCNC: 140 MMOL/L
SPECIFIC GRAVITY URINE: 1.01
TRIGL SERPL-MCNC: 84 MG/DL
TSH SERPL-ACNC: 2.38 UIU/ML
UROBILINOGEN URINE: NORMAL
VIT B12 SERPL-MCNC: 1349 PG/ML
WBC # FLD AUTO: 6.03 K/UL

## 2022-04-19 RX ORDER — UBIDECARENONE 200 MG
200 CAPSULE ORAL
Qty: 90 | Refills: 0 | Status: ACTIVE | COMMUNITY
Start: 2022-04-19

## 2022-04-20 ENCOUNTER — TRANSCRIPTION ENCOUNTER (OUTPATIENT)
Age: 72
End: 2022-04-20

## 2022-05-11 ENCOUNTER — APPOINTMENT (OUTPATIENT)
Dept: CARDIOLOGY | Facility: CLINIC | Age: 72
End: 2022-05-11
Payer: MEDICARE

## 2022-05-11 PROCEDURE — 93306 TTE W/DOPPLER COMPLETE: CPT

## 2022-05-13 ENCOUNTER — NON-APPOINTMENT (OUTPATIENT)
Age: 72
End: 2022-05-13

## 2022-06-21 ENCOUNTER — APPOINTMENT (OUTPATIENT)
Dept: CARDIOLOGY | Facility: CLINIC | Age: 72
End: 2022-06-21

## 2022-06-21 ENCOUNTER — NON-APPOINTMENT (OUTPATIENT)
Age: 72
End: 2022-06-21

## 2022-06-21 VITALS
WEIGHT: 114 LBS | DIASTOLIC BLOOD PRESSURE: 78 MMHG | BODY MASS INDEX: 20.85 KG/M2 | SYSTOLIC BLOOD PRESSURE: 130 MMHG | OXYGEN SATURATION: 100 % | HEART RATE: 58 BPM

## 2022-06-21 DIAGNOSIS — I10 ESSENTIAL (PRIMARY) HYPERTENSION: ICD-10-CM

## 2022-06-21 PROCEDURE — 99214 OFFICE O/P EST MOD 30 MIN: CPT

## 2022-06-21 PROCEDURE — 93000 ELECTROCARDIOGRAM COMPLETE: CPT

## 2022-06-21 NOTE — HISTORY OF PRESENT ILLNESS
[FreeTextEntry1] : She stopped metoprolol and crestor of late (3 day) because of muscle aches and joint and pain.\par Exercise limited by leg fatigue.\par \par Prior:\par Dear Dr. Farias,\par Thank you for referring her for cardiovascular relation.  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  She reports feeling well overall and is able to go about her usual activities including yoga and walking without any difficulty.  She denies any exertional chest pains or shortness of breath.  She has no episodes of lightheadedness, dizziness or syncope.\par She has had difficulty tolerating her statins in the past and atorvastatin has caused overwhelming fatigue, rosuvastatin has caused muscle aches that do not allow her to perform her usual yoga.  She was recently restarted on rosuvastatin with co-Q10 and seems to be tolerating it fairly well.  She is scheduled to have blood work in the near future.\par She was recently told of a family history of hypertrophic cardiomyopathy.  Her brother and sister had echo that showed LVH as well as confirmatory MRIs.\par She had an echocardiogram dated December 11, 2019 that reported concentric hypertrophy of the left ventricle with an interventricular systolic diameter of 1.3 cm.\par Her past medical history is otherwise notable for resolved ileitis remote treatment for latent TB.  And recent migraines with left visual disturbance.\par She has no history of coronary artery disease, diabetes mellitus, smoking and does have carotid atherosclerosis based on recent ultrasound study.\par 
negative...

## 2022-06-21 NOTE — DISCUSSION/SUMMARY
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  If significant LVH is present I would proceed with a cardiac MRI in order to exclude an infiltrative cardiomyopathy as the source of her symptoms.\par CAD primary prevention:qod crestor.

## 2022-07-14 ENCOUNTER — RX RENEWAL (OUTPATIENT)
Age: 72
End: 2022-07-14

## 2022-07-20 ENCOUNTER — APPOINTMENT (OUTPATIENT)
Dept: PAIN MANAGEMENT | Facility: CLINIC | Age: 72
End: 2022-07-20

## 2022-08-09 ENCOUNTER — APPOINTMENT (OUTPATIENT)
Dept: INTERNAL MEDICINE | Facility: CLINIC | Age: 72
End: 2022-08-09

## 2022-08-09 VITALS
TEMPERATURE: 99 F | DIASTOLIC BLOOD PRESSURE: 70 MMHG | HEIGHT: 62 IN | BODY MASS INDEX: 20.98 KG/M2 | OXYGEN SATURATION: 97 % | SYSTOLIC BLOOD PRESSURE: 124 MMHG | WEIGHT: 114 LBS | RESPIRATION RATE: 14 BRPM | HEART RATE: 77 BPM

## 2022-08-09 DIAGNOSIS — M25.561 PAIN IN RIGHT KNEE: ICD-10-CM

## 2022-08-09 DIAGNOSIS — R73.01 IMPAIRED FASTING GLUCOSE: ICD-10-CM

## 2022-08-09 DIAGNOSIS — M16.11 UNILATERAL PRIMARY OSTEOARTHRITIS, RIGHT HIP: ICD-10-CM

## 2022-08-09 DIAGNOSIS — E03.9 HYPOTHYROIDISM, UNSPECIFIED: ICD-10-CM

## 2022-08-09 PROCEDURE — 99214 OFFICE O/P EST MOD 30 MIN: CPT

## 2022-08-09 RX ORDER — METOPROLOL SUCCINATE 50 MG/1
50 TABLET, EXTENDED RELEASE ORAL
Qty: 30 | Refills: 1 | Status: COMPLETED | COMMUNITY
Start: 2022-07-14 | End: 2022-08-09

## 2022-08-09 NOTE — ASSESSMENT
[FreeTextEntry1] : 73 yo F pmhx HTN, HLD, hypothyroidism, regional ileitis, anemia, hx LTBI s/p INH tx course, food allergy, narrow angle glaucoma here for f/u on ch medical issues \par \par Lower back pain with rt radiculopathy and rt knee pain OA on xray - for > 2 montsh - unable to walk and climb stair - ddi PT 4 session no help painful \par -Get MRI L spina nd RT Knee \par - referaal for PT and Sp sp given \par \par HTN- BP wnl, asx\par --saw cardio 4/14/22 \par ECHO 5/11/22 - Asymmetric septal hypertrophy, hyperdynamic ventricle - was placed on metoprolol 25 qd - gave her fatigue she reduced to 1/2 tab  - MRI cardiac ordered \par -hx negative screening stress test (? 2017) per pt\par -screening EKG: NSR @ 66 bpm, nl axis, no LVH/path Q/ST chgs - to be scanned into chart\par -on enalapril 20 mg qd\par -followed by optho, yearly screening advised\par -low salt diet, exercise advised\par -asked to forward record\par -check cmp, screening UA\par \par HLD-ASCVD risk 11.6 % \par -on crestor 10 qhs every othe day - C/o myopathy get CPK \par -willing to consider alternate statin trial after repeat labs \par -low fast diet, exercise advised\par -check lipids fasting \par \par hypothyroidism-\par -on Levothyroxine 50mcg\par -check TSH\par \par hx right sided regional ileitis, dx'd in 2006- told healed since, on Mesalamine qd\par -followed by GI, Dr. Russo, s/p c-cope 5/21 which gets q 2 yrs with EGD per pt- was taken off Mesalamine \par -asked to forward records\par \par hx narrow angle glaucoma- hx b/l laser surgery ~4 yrs ago\par -followed optho, last seen 3/18/21- has f/u 3/2022 \par -on Timolol gtt\par \par hx food/seasonal allergies, hx cough in cold air- \par -A/I referral for eval and r/o asthma\par -declines epi pen\par -cont to avoid apples/pears/nuts advised\par \par hx +PPD 6/02 s/p INH tx x 6 mo\par -[place]\par -reports last cxr in 2018 for work screening, told showed calcification- seen by pulm, told c/w scarring from prior TB and no intervention needed\par \par saw sleep sp did Sleep study dx as Mild DAGMAR \par \par Insomnia\par - sleep hygiene reviewed \par -avoid electronics after 9 pm \par -limit caffein intake in evenings \par - could not tolerate melatonin \par -trial of Ambien 5 mg qhs as needed only \par \par -hx covid vaccine (pfizer), completed 2/13/21 booster - 12/2021 \par \par HCM\par  hep C screening 3/2021 neg \par  flu shot- 11/2021 \par Tdap booster- 3/2021 \par Prevnar 20 3/11/22 \par hx hep B series in past \par immune to MMR/Varicella and hep B per 6/02 labs- to be scanned into chart\par hx Shingrix #1 in 2/21, plans to get #2 with EHS\par hx negative PAP 2010 by GYN per pt, referral given per request\par mammo 4/6/21 bi rad 1 \par hx nl DEXA > 10 yrs ago per pt, declines repeat \par yearly derm skin screening exam advised. Regular use of sun block for skin cancer prevention advised.\par yearly dental screening advised\par advised to designate HCP and provide copy of completed form for records\par colonoscope 2021 - no polyps - hx colitis - recommended to stop mesalamine - due 2023 \par \par Pt's cell: [phone]\par pt will do fasting BW \par

## 2022-08-09 NOTE — REVIEW OF SYSTEMS
[Negative] : Gastrointestinal [Joint Pain] : joint pain [Joint Stiffness] : joint stiffness [Joint Swelling] : joint swelling [FreeTextEntry9] : rt knee and lower back pain rt sciatica and parasthesia

## 2022-08-09 NOTE — PHYSICAL EXAM
[Normal] : soft, non-tender, non-distended, no masses palpated, no HSM and normal bowel sounds [de-identified] : rt knee rom restricted to flexion

## 2022-08-09 NOTE — HISTORY OF PRESENT ILLNESS
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, HLD, hypothyroidism, regional ileitis, anemia, hx LTBI s/p INH tx course, food allergy, narrow angle glaucoma seen today for f/u on ch medical issues \par \par Working in [employer] part time [employer] \par \par has been having right leg sciatica and parasthesias - doing butch regularly - but past 3 weeks was not able to do it - she tried Pt - did Xrays back and knee ok- mild DJD changes - was advised MRI - gets tight in her thigh rt and back pain on walking - hx rt knee oa - cannot flex all the way back \par \par hx right sided regional ileitis, dx'd in 2006- told healed since, on Mesalamine qd\par -followed by GI, Dr. Russo, did c-cope 5/21 which gets q 2 yrs with EGD - recommended - to stop Mesalamine \par \par HTN- dx'd in 1995\par -on enalapril 20 mg qd, taking this dose x 2 yrs\par -denies HA, dizziness, CP or sob\par -saw cardio 4/14/22 \par ECHO 5/11/22 - Asymmetric septal hypertrophy, hyperdynamic ventricle - was placed on metoprolol 25 qd - gave her fatigue she reduced to 1/2 tab  - MRI cardiac ordered \par \par HLD -tried stopping statin in 9 to feb repeat lipids chol 260 high - c/o myopathy from crestor -get CPK \par -reports hx Crestor 10 mg qd per PMD (also cardiologist) taken since 2019- taking every other day \par -denies hx prior statin use, hx CAD or CVA\par -reports hx negative screening stress test in past (? 4 yrs ago)\par \par Reports is vegetarian, eating healthy. Notes stable weight in past year.\par exercise: yoga daily \par \par hypothyroidism- dx'd on routine labs 5 yrs ago\par -on levothyroxine 50 mcg, taking this dose since dx\par -last TSH checked 2019, states calls in own refills prn\par \par hx narrow angle glaucoma- hx b/l laser surgery ~4 yrs ago\par -followed optho, last seen 3/20- told doing well and has f/u 3/18/21\par -on Timolol gtt\par -denies eye pain or vision trouble\par \par hx +PPD 6/02 s/p INH tx x 6 mo\par -[place]\par -reports last cxr in 2018 for work screening, told showed calcification- seen by pulm, told c/w scarring from prior TB and no intervention needed\par \par \par \par c/o insomnia- saw sleep sp - recommended to do sleep specialist - \par -goes to bed 10:30 wakes up 1 :30 then restless sleep \par -took melatonin in past for 1 month dev abd discomfort so stopped \par \par hx food allergy- avoids apples/pears/nuts\par -has coughing on/off when in cold air, using MDI prn with help- used 2x/mo. Denies hx pulm eval for this or PFTs to r/o asthma.\par -hx idiopathic pulmonary fibrosis in mother\par -denies hx epi-pen use, none at home- declines Rx\par \par Denies Depression or anxiety. \par  \par

## 2022-08-11 ENCOUNTER — APPOINTMENT (OUTPATIENT)
Dept: MRI IMAGING | Facility: IMAGING CENTER | Age: 72
End: 2022-08-11

## 2022-08-11 ENCOUNTER — OUTPATIENT (OUTPATIENT)
Dept: OUTPATIENT SERVICES | Facility: HOSPITAL | Age: 72
LOS: 1 days | End: 2022-08-11
Payer: MEDICARE

## 2022-08-11 DIAGNOSIS — Z98.89 OTHER SPECIFIED POSTPROCEDURAL STATES: Chronic | ICD-10-CM

## 2022-08-11 DIAGNOSIS — M54.9 DORSALGIA, UNSPECIFIED: ICD-10-CM

## 2022-08-11 PROCEDURE — 72148 MRI LUMBAR SPINE W/O DYE: CPT | Mod: 26,MH

## 2022-08-11 PROCEDURE — 72148 MRI LUMBAR SPINE W/O DYE: CPT

## 2022-08-11 PROCEDURE — 73721 MRI JNT OF LWR EXTRE W/O DYE: CPT

## 2022-08-11 PROCEDURE — 73721 MRI JNT OF LWR EXTRE W/O DYE: CPT | Mod: 26,RT,MH

## 2022-08-17 ENCOUNTER — TRANSCRIPTION ENCOUNTER (OUTPATIENT)
Age: 72
End: 2022-08-17

## 2022-08-17 ENCOUNTER — NON-APPOINTMENT (OUTPATIENT)
Age: 72
End: 2022-08-17

## 2022-08-19 ENCOUNTER — APPOINTMENT (OUTPATIENT)
Dept: PHYSICAL MEDICINE AND REHAB | Facility: CLINIC | Age: 72
End: 2022-08-19

## 2022-08-19 VITALS
SYSTOLIC BLOOD PRESSURE: 147 MMHG | HEART RATE: 69 BPM | OXYGEN SATURATION: 99 % | DIASTOLIC BLOOD PRESSURE: 84 MMHG | TEMPERATURE: 96.3 F

## 2022-08-19 PROCEDURE — 99204 OFFICE O/P NEW MOD 45 MIN: CPT

## 2022-08-19 NOTE — ASSESSMENT
[FreeTextEntry1] : Acute exacerbation of two compartment osteoarthritis of right knee in a 72-year-old active female.  There is moderate quads atrophy present.  We discussed the following treatment plan:\par \par -Home exercise program to consist of quad sets, and straight leg raise as tolerated\par -Ice right knee as able\par -Not present OTC 2 tabs twice a day for 7 to 10 days\par -Modify activity avoiding any activity that might exacerbate her pain\par - pool exercise for physical activity\par -Contact me in 3 weeks time to let me know how she is doing.  If the knee is doing a lot better then will refer for supervised physical therapy to advance her exercise program \par -Briefly discussed steroid injection in the right knee, however she was not interested in this\par \par

## 2022-08-19 NOTE — DATA REVIEWED
[FreeTextEntry1] : Reviewed MRI of the lumbosacral spine from 8/11/2022.  This shows mild degenerative change for age.  There is no nerve root impingement present.\par \par Also reviewed MRI of right knee as well as the report of this imaging done on August 11, 2022.\par \par IMPRESSION:\par 1.  Complex tearing the body through posterior horn/root of the medial meniscus with mild medial meniscal extrusion.\par 2.  Moderate to severe cartilage loss in the medial and patellofemoral compartments as above\par 3.  Joint effusion with synovitis.\par 4.  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

## 2022-08-19 NOTE — CONSULT LETTER
[Dear  ___] : Dear  [unfilled], [Consult Letter:] : I had the pleasure of evaluating your patient, [unfilled]. [Please see my note below.] : Please see my note below. [Consult Closing:] : Thank you very much for allowing me to participate in the care of this patient.  If you have any questions, please do not hesitate to contact me. [Sincerely,] : Sincerely, [FreeTextEntry3] : EVETTE Davis MD\par

## 2022-08-19 NOTE — HISTORY OF PRESENT ILLNESS
[FreeTextEntry1] : The patient is a 72 year female  seen in consultation at the request of SELF, for evaluation and management of knee pain.  Onset of pain 6 to 8 weeks ago. Mechanism of injury: none.The pain is located in the lateral right knee as well as the distal lateral right thigh, posterior knee and posterior distal thigh.  .It is worse with weightbearing   . It eases with nonweightbearing.  Her back has also started to ache recently\par \par It was initially thought the pain was coming from her back.  She was referred to physical therapy and after 2 session of this her pain worsened.  She then saw Dr. Farias, her primary care physician who ordered an MRI of her lumbar spine and her right knee.  She is also referred to physical therapy again and had 1 session of this with a home exercise program and her pain has worsened.  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  She  quit yoga in the last 1 month due to the knee pain and would like to get back to this.  Functionally she is able to get around her house but has increased pain when on her feet for a while.  Stairs are also difficult for her.\par \par \par

## 2022-08-19 NOTE — PHYSICAL EXAM
[FreeTextEntry1] : Physical Examination:\par \par Posture: Normal\par \par Atrophy: Mild to moderate right quads atrophy.\par \par Tenderness: None of significance including none along the joint line.\par \par Deformity: None\par Effusion: Small\par \par Range of motion	Right knee	Left knee	\par Extension		lacks 5 degrees              full\par Flexion		110	                       135\par \par Special Tests Right knee     Left knee			\par Lachman’s: Negative             negative			\par Anterior drawer: Negative			\par Mc Kearns’s; unable to tolerate			\par Collateral ligaments: Intact		\par Patellar apprehension; not assessed		\par \par Strength testing (R/L): 4/5 hip flexion, 3/5 knee extension limited by pain.  Ankle dorsiflexion and EHL 5/ 5.\par 5/5 hip extension strength bilaterally.\par \par Patellar reflexes +1 right knee +2 left knee +2 both ankles\par \par Straight leg raise is negative	\par \par \par

## 2022-08-25 ENCOUNTER — APPOINTMENT (OUTPATIENT)
Dept: MRI IMAGING | Facility: CLINIC | Age: 72
End: 2022-08-25

## 2022-08-25 ENCOUNTER — OUTPATIENT (OUTPATIENT)
Dept: OUTPATIENT SERVICES | Facility: HOSPITAL | Age: 72
LOS: 1 days | End: 2022-08-25
Payer: MEDICARE

## 2022-08-25 ENCOUNTER — RESULT REVIEW (OUTPATIENT)
Age: 72
End: 2022-08-25

## 2022-08-25 DIAGNOSIS — I51.7 CARDIOMEGALY: ICD-10-CM

## 2022-08-25 DIAGNOSIS — Z98.89 OTHER SPECIFIED POSTPROCEDURAL STATES: Chronic | ICD-10-CM

## 2022-08-25 PROCEDURE — 75561 CARDIAC MRI FOR MORPH W/DYE: CPT | Mod: 26,MH

## 2022-08-25 PROCEDURE — 75565 CARD MRI VELOC FLOW MAPPING: CPT | Mod: 26,MH

## 2022-08-25 PROCEDURE — 75565 CARD MRI VELOC FLOW MAPPING: CPT | Mod: MH

## 2022-08-25 PROCEDURE — 75561 CARDIAC MRI FOR MORPH W/DYE: CPT

## 2022-09-06 ENCOUNTER — TRANSCRIPTION ENCOUNTER (OUTPATIENT)
Age: 72
End: 2022-09-06

## 2022-09-07 ENCOUNTER — APPOINTMENT (OUTPATIENT)
Dept: CARDIOLOGY | Facility: CLINIC | Age: 72
End: 2022-09-07

## 2022-09-07 VITALS
WEIGHT: 115 LBS | OXYGEN SATURATION: 98 % | HEART RATE: 75 BPM | DIASTOLIC BLOOD PRESSURE: 80 MMHG | SYSTOLIC BLOOD PRESSURE: 140 MMHG | BODY MASS INDEX: 21.03 KG/M2

## 2022-09-07 DIAGNOSIS — E78.5 HYPERLIPIDEMIA, UNSPECIFIED: ICD-10-CM

## 2022-09-07 PROCEDURE — 93000 ELECTROCARDIOGRAM COMPLETE: CPT | Mod: NC

## 2022-09-07 PROCEDURE — 99214 OFFICE O/P EST MOD 30 MIN: CPT

## 2022-10-02 PROBLEM — E78.5 HYPERLIPIDEMIA: Status: ACTIVE | Noted: 2021-03-10

## 2022-10-02 NOTE — HISTORY OF PRESENT ILLNESS
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  She reports feeling well overall and is able to go about her usual activities including yoga and walking without any difficulty.  She denies any exertional chest pains or shortness of breath.  She has no episodes of lightheadedness, dizziness or syncope.\par She has had difficulty tolerating her statins in the past and atorvastatin has caused overwhelming fatigue, rosuvastatin has caused muscle aches that do not allow her to perform her usual yoga.  She was recently restarted on rosuvastatin with co-Q10 and seems to be tolerating it fairly well.  She is scheduled to have blood work in the near future.\par She was recently told of a family history of hypertrophic cardiomyopathy.  Her brother and sister had echo that showed LVH as well as confirmatory MRIs.\par She had an echocardiogram dated December 11, 2019 that reported concentric hypertrophy of the left ventricle with an interventricular systolic diameter of 1.3 cm.\par Recent Cardiac MRI showed no significant LVH.\par Her past medical history is otherwise notable for resolved ileitis remote treatment for latent TB.  And recent migraines with left visual disturbance.\par She has no history of coronary artery disease, diabetes mellitus, smoking and does have carotid atherosclerosis based on recent ultrasound study.\par

## 2022-10-02 NOTE — DISCUSSION/SUMMARY
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

## 2023-02-01 ENCOUNTER — APPOINTMENT (OUTPATIENT)
Dept: PHYSICAL MEDICINE AND REHAB | Facility: CLINIC | Age: 73
End: 2023-02-01
Payer: MEDICARE

## 2023-02-01 VITALS
DIASTOLIC BLOOD PRESSURE: 82 MMHG | HEART RATE: 70 BPM | OXYGEN SATURATION: 97 % | SYSTOLIC BLOOD PRESSURE: 146 MMHG | TEMPERATURE: 96.6 F

## 2023-02-01 DIAGNOSIS — R29.898 OTHER SYMPTOMS AND SIGNS INVOLVING THE MUSCULOSKELETAL SYSTEM: ICD-10-CM

## 2023-02-01 PROCEDURE — 99214 OFFICE O/P EST MOD 30 MIN: CPT

## 2023-02-01 NOTE — ASSESSMENT
[FreeTextEntry1] : Symptomatic osteoarthritis right knee.  We had a lengthy discussion.  We reviewed her various exercise activities.  It does not appear that any of these individually are exacerbating the inflammation in her knee; is likely combination of the excessive activity.  Advised her to cut back on these.  I do think physical therapy would be beneficial to specifically strengthen her quadricep muscle as well as her hip abductors and extensors and her core to offload the loading of the right knee.  I gave her a prescription for this.\par \par Follow-up as needed.

## 2023-02-01 NOTE — HISTORY OF PRESENT ILLNESS
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  She is here today for follow-up.  She said since her last visit she underwent arthroscopic meniscectomy of the right knee and had no pain for the first 10 days following surgery but then the swelling and pain returned and has persisted since then.  She had physical therapy for 8 to 10 weeks which did not give her any significant benefit.  due to anticipated trip to [facility] orthopedic surgeon aspirated approximately 30 mils of fluid off her knee and gave her steroid injection she had no pain while on the trip although it started to return towards the time she was returned to the [place].  Now that she is back she does daily yoga and meditation as well as water aerobics over the last 2 weeks.  She is using the [employer] machine and is cycling at the gym.  She has had 2 Pilates classes.  She feels that too much of these activities cause increased swelling and heaviness in her knee.  The pain is present in the medial knee but is not her primary symptom.  She is here today to figure out what she should and should not do regarding managing her knee pain and whether she needs more physical therapy.\par Activity wise in addition to the physical exercise mentioned previously she does her house hold work daily on most days and walks less than a mile in the morning.

## 2023-02-01 NOTE — PHYSICAL EXAM
[FreeTextEntry1] : On examination, patient is alert and in no acute distress.\par \par Regular gait is normal.\par \par Right knee examination there is a mild to moderate effusion present.  There is marked quads atrophy.  There is no joint line tenderness present.  There is no obvious anteroposterior laxity.  Patellar compression test is negative.\par \par She is good quads activation on the left and good left hip abduction and extension strength.  Hip abduction is fair on the right and right hip extension is fair to good.

## 2023-02-01 NOTE — DATA REVIEWED
[FreeTextEntry1] : From her last visit: Reviewed MRI of the lumbosacral spine from 8/11/2022. This shows mild degenerative change for age. There is no nerve root impingement present.\par \par Also reviewed MRI of right knee as well as the report of this imaging done on August 11, 2022.\par \par IMPRESSION:\par 1. Complex tearing the body through posterior horn/root of the medial meniscus with mild medial meniscal extrusion.\par 2. Moderate to severe cartilage loss in the medial and patellofemoral compartments as above\par 3. Joint effusion with synovitis.\par 4. 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 \par

## 2023-02-28 ENCOUNTER — NON-APPOINTMENT (OUTPATIENT)
Age: 73
End: 2023-02-28

## 2023-03-21 ENCOUNTER — APPOINTMENT (OUTPATIENT)
Dept: INTERNAL MEDICINE | Facility: CLINIC | Age: 73
End: 2023-03-21

## 2023-03-23 ENCOUNTER — NON-APPOINTMENT (OUTPATIENT)
Age: 73
End: 2023-03-23

## 2023-06-08 ENCOUNTER — OUTPATIENT (OUTPATIENT)
Dept: OUTPATIENT SERVICES | Facility: HOSPITAL | Age: 73
LOS: 1 days | End: 2023-06-08
Payer: MEDICARE

## 2023-06-08 ENCOUNTER — RESULT REVIEW (OUTPATIENT)
Age: 73
End: 2023-06-08

## 2023-06-08 ENCOUNTER — APPOINTMENT (OUTPATIENT)
Dept: MAMMOGRAPHY | Facility: IMAGING CENTER | Age: 73
End: 2023-06-08
Payer: MEDICARE

## 2023-06-08 DIAGNOSIS — Z00.8 ENCOUNTER FOR OTHER GENERAL EXAMINATION: ICD-10-CM

## 2023-06-08 DIAGNOSIS — N63.20 UNSPECIFIED LUMP IN THE LEFT BREAST, UNSPECIFIED QUADRANT: ICD-10-CM

## 2023-06-08 DIAGNOSIS — N64.89 OTHER SPECIFIED DISORDERS OF BREAST: ICD-10-CM

## 2023-06-08 DIAGNOSIS — Z98.89 OTHER SPECIFIED POSTPROCEDURAL STATES: Chronic | ICD-10-CM

## 2023-06-08 PROCEDURE — 77067 SCR MAMMO BI INCL CAD: CPT

## 2023-06-08 PROCEDURE — 77067 SCR MAMMO BI INCL CAD: CPT | Mod: 26

## 2023-06-08 PROCEDURE — 77063 BREAST TOMOSYNTHESIS BI: CPT | Mod: 26

## 2023-06-08 PROCEDURE — 77063 BREAST TOMOSYNTHESIS BI: CPT

## 2023-06-14 ENCOUNTER — RESULT REVIEW (OUTPATIENT)
Age: 73
End: 2023-06-14

## 2023-06-14 ENCOUNTER — APPOINTMENT (OUTPATIENT)
Dept: ULTRASOUND IMAGING | Facility: CLINIC | Age: 73
End: 2023-06-14

## 2023-06-14 ENCOUNTER — APPOINTMENT (OUTPATIENT)
Dept: MAMMOGRAPHY | Facility: CLINIC | Age: 73
End: 2023-06-14
Payer: MEDICARE

## 2023-06-14 ENCOUNTER — OUTPATIENT (OUTPATIENT)
Dept: OUTPATIENT SERVICES | Facility: HOSPITAL | Age: 73
LOS: 1 days | End: 2023-06-14
Payer: MEDICARE

## 2023-06-14 DIAGNOSIS — Z00.8 ENCOUNTER FOR OTHER GENERAL EXAMINATION: ICD-10-CM

## 2023-06-14 DIAGNOSIS — Z98.89 OTHER SPECIFIED POSTPROCEDURAL STATES: Chronic | ICD-10-CM

## 2023-06-14 PROCEDURE — 76641 ULTRASOUND BREAST COMPLETE: CPT | Mod: 26,50,GZ

## 2023-06-14 PROCEDURE — 76641 ULTRASOUND BREAST COMPLETE: CPT

## 2023-06-14 PROCEDURE — G0279: CPT

## 2023-06-14 PROCEDURE — G0279: CPT | Mod: 26

## 2023-06-14 PROCEDURE — 77066 DX MAMMO INCL CAD BI: CPT | Mod: 26

## 2023-06-14 PROCEDURE — 77066 DX MAMMO INCL CAD BI: CPT

## 2023-06-21 ENCOUNTER — RX RENEWAL (OUTPATIENT)
Age: 73
End: 2023-06-21

## 2023-07-19 ENCOUNTER — NON-APPOINTMENT (OUTPATIENT)
Age: 73
End: 2023-07-19

## 2023-07-19 ENCOUNTER — APPOINTMENT (OUTPATIENT)
Dept: INTERNAL MEDICINE | Facility: CLINIC | Age: 73
End: 2023-07-19
Payer: MEDICARE

## 2023-07-19 VITALS
OXYGEN SATURATION: 98 % | BODY MASS INDEX: 20.8 KG/M2 | TEMPERATURE: 98 F | HEIGHT: 62 IN | DIASTOLIC BLOOD PRESSURE: 84 MMHG | SYSTOLIC BLOOD PRESSURE: 150 MMHG | WEIGHT: 113 LBS | HEART RATE: 59 BPM

## 2023-07-19 VITALS — DIASTOLIC BLOOD PRESSURE: 79 MMHG | SYSTOLIC BLOOD PRESSURE: 136 MMHG

## 2023-07-19 DIAGNOSIS — Z86.69 PERSONAL HISTORY OF OTHER DISEASES OF THE NERVOUS SYSTEM AND SENSE ORGANS: ICD-10-CM

## 2023-07-19 DIAGNOSIS — M54.9 DORSALGIA, UNSPECIFIED: ICD-10-CM

## 2023-07-19 DIAGNOSIS — R09.89 OTHER SPECIFIED SYMPTOMS AND SIGNS INVOLVING THE CIRCULATORY AND RESPIRATORY SYSTEMS: ICD-10-CM

## 2023-07-19 DIAGNOSIS — R41.89 OTHER SYMPTOMS AND SIGNS INVOLVING COGNITIVE FUNCTIONS AND AWARENESS: ICD-10-CM

## 2023-07-19 DIAGNOSIS — I51.7 CARDIOMEGALY: ICD-10-CM

## 2023-07-19 DIAGNOSIS — Z01.818 ENCOUNTER FOR OTHER PREPROCEDURAL EXAMINATION: ICD-10-CM

## 2023-07-19 DIAGNOSIS — M79.604 DORSALGIA, UNSPECIFIED: ICD-10-CM

## 2023-07-19 DIAGNOSIS — S83.249A OTHER TEAR OF MEDIAL MENISCUS, CURRENT INJURY, UNSPECIFIED KNEE, INITIAL ENCOUNTER: ICD-10-CM

## 2023-07-19 DIAGNOSIS — Z01.810 ENCOUNTER FOR PREPROCEDURAL CARDIOVASCULAR EXAMINATION: ICD-10-CM

## 2023-07-19 DIAGNOSIS — G47.19 OTHER HYPERSOMNIA: ICD-10-CM

## 2023-07-19 PROCEDURE — 93000 ELECTROCARDIOGRAM COMPLETE: CPT

## 2023-07-19 PROCEDURE — 99214 OFFICE O/P EST MOD 30 MIN: CPT | Mod: 25

## 2023-07-19 RX ORDER — ZOLPIDEM TARTRATE 5 MG/1
5 TABLET ORAL
Qty: 15 | Refills: 0 | Status: DISCONTINUED | COMMUNITY
Start: 2021-08-10 | End: 2023-07-19

## 2023-07-19 RX ORDER — ALBUTEROL SULFATE 90 UG/1
108 (90 BASE) AEROSOL, METERED RESPIRATORY (INHALATION)
Refills: 0 | Status: DISCONTINUED | COMMUNITY
End: 2023-07-19

## 2023-07-19 RX ORDER — MESALAMINE 1.2 G/1
1.2 TABLET, DELAYED RELEASE ORAL
Refills: 0 | Status: DISCONTINUED | COMMUNITY
End: 2023-07-19

## 2023-07-19 NOTE — PHYSICAL EXAM
[No Acute Distress] : no acute distress [Well-Appearing] : well-appearing [Normal Voice/Communication] : normal voice/communication [Pedal Pulses Present] : the pedal pulses are present [No Carotid Bruits] : no carotid bruits [No Edema] : there was no peripheral edema [Normal Supraclavicular Nodes] : no supraclavicular lymphadenopathy [Grossly Normal Strength/Tone] : grossly normal strength/tone [Coordination Grossly Intact] : coordination grossly intact [No Focal Deficits] : no focal deficits [Normal Gait] : normal gait [Normal] : affect was normal and insight and judgment were intact

## 2023-07-20 LAB
25(OH)D3 SERPL-MCNC: 33 NG/ML
ALBUMIN SERPL ELPH-MCNC: 4.6 G/DL
ALP BLD-CCNC: 39 U/L
ALT SERPL-CCNC: 20 U/L
ANION GAP SERPL CALC-SCNC: 13 MMOL/L
APTT BLD: 27.8 SEC
AST SERPL-CCNC: 25 U/L
BILIRUB SERPL-MCNC: 0.5 MG/DL
BUN SERPL-MCNC: 13 MG/DL
CALCIUM SERPL-MCNC: 10.3 MG/DL
CHLORIDE SERPL-SCNC: 102 MMOL/L
CHOLEST SERPL-MCNC: 216 MG/DL
CO2 SERPL-SCNC: 24 MMOL/L
CREAT SERPL-MCNC: 0.76 MG/DL
CRP SERPL-MCNC: <3 MG/L
EGFR: 83 ML/MIN/1.73M2
ESTIMATED AVERAGE GLUCOSE: 123 MG/DL
FRUCTOSAMINE SERPL-MCNC: 250 UMOL/L
GLUCOSE SERPL-MCNC: 90 MG/DL
HBA1C MFR BLD HPLC: 5.9 %
HDLC SERPL-MCNC: 68 MG/DL
INR PPP: 0.98 RATIO
LDLC SERPL CALC-MCNC: 120 MG/DL
NONHDLC SERPL-MCNC: 148 MG/DL
POTASSIUM SERPL-SCNC: 4.6 MMOL/L
PROT SERPL-MCNC: 6.9 G/DL
PT BLD: 11.5 SEC
SODIUM SERPL-SCNC: 139 MMOL/L
TRIGL SERPL-MCNC: 159 MG/DL
TSH SERPL-ACNC: 2.39 UIU/ML
VIT B12 SERPL-MCNC: 1029 PG/ML

## 2023-07-20 NOTE — ASSESSMENT
[High Risk Surgery - Intraperitoneal, Intrathoracic or Supringuinal Vascular Procedures] : High Risk Surgery - Intraperitoneal, Intrathoracic or Supringuinal Vascular Procedures - No (0) [Ischemic Heart Disease] : Ischemic Heart Disease - No (0) [Congestive Heart Failure] : Congestive Heart Failure - No (0) [Prior Cerebrovascular Accident or TIA] : Prior Cerebrovascular Accident or TIA - No (0) [Creatinine >= 2mg/dL (1 Point)] : Creatinine >= 2mg/dL - No (0) [Insulin-dependent Diabetic (1 Point)] : Insulin-dependent Diabetic - No (0) [0] : 0 , RCRI Class: I, Risk of Post-Op Cardiac Complications: 3.9%, 95% CI for Risk Estimate: 2.8% - 5.4% [Continue medications as is] : Continue current medications [As per surgery] : as per surgery [FreeTextEntry4] : Pre-op labs drawn today as well as other follow up lab work.  \par EKG today - sinus bradycardia, no ischemic changes, no changes from prior\par \par ADD, 7/20/23 - lab results reviewed, patient optimized for surgery  [FreeTextEntry7] : - hold supplements 1 week prior to surgery

## 2023-07-20 NOTE — REVIEW OF SYSTEMS
[Joint Pain] : joint pain [Fever] : no fever [Chills] : no chills [Fatigue] : no fatigue [Night Sweats] : no night sweats [Chest Pain] : no chest pain [Palpitations] : no palpitations [Lower Ext Edema] : no lower extremity edema [Orthopnea] : no orthopnea [Wheezing] : no wheezing [Cough] : no cough [Dyspnea on Exertion] : no dyspnea on exertion [Abdominal Pain] : no abdominal pain [Nausea] : no nausea [Constipation] : no constipation [Diarrhea] : diarrhea [Vomiting] : no vomiting [Melena] : no melena [Back Pain] : no back pain [Headache] : no headache [Dizziness] : no dizziness [Fainting] : no fainting [Easy Bleeding] : no easy bleeding [Easy Bruising] : no easy bruising

## 2023-07-20 NOTE — HISTORY OF PRESENT ILLNESS
[No Pertinent Cardiac History] : no history of aortic stenosis, atrial fibrillation, coronary artery disease, recent myocardial infarction, or implantable device/pacemaker [No Pertinent Pulmonary History] : no history of asthma, COPD, sleep apnea, or smoking [(Patient denies any chest pain, claudication, dyspnea on exertion, orthopnea, palpitations or syncope)] : Patient denies any chest pain, claudication, dyspnea on exertion, orthopnea, palpitations or syncope [Moderate (4-6 METs)] : Moderate (4-6 METs) [Self] : no previous adverse anesthesia reaction [Chronic Anticoagulation] : no chronic anticoagulation [Chronic Kidney Disease] : no chronic kidney disease [Diabetes] : no diabetes [FreeTextEntry1] : Right knee total replacement  [FreeTextEntry2] : 7/27/23 [FreeTextEntry3] : Dr. Florentino Juan  [FreeTextEntry4] : 73 y.o. female, PMHx htn, hld, hypothyroid, ileitis, glaucoma, latent TB s/p INH x 6 months.  \par Presents for presurgical medical evaluation.  \par Feeling overall well.  \par Meniscectomy about 1 year ago with continue pain and deterioration in the right knee since then.  No surgical complications or anesthesia reaction.  \par Cardiology evaluation prior to that surgery.  \par Less activity overall d/t knee pain.  Maintains house work, walking, toga as tolerated. \par H/o ileitis, now resolved, off mesalamine x 2 years.  Continues regular f/u with GI, asymptomatic.  \par  [FreeTextEntry7] : echo 5/2022 - asymmetric septal hypertrophy\par cardiac MRI 9/2022 - normal systolic function with no hypertrophy \par  [FreeTextEntry8] : limited by knee pain

## 2023-08-10 ENCOUNTER — NON-APPOINTMENT (OUTPATIENT)
Age: 73
End: 2023-08-10

## 2023-08-10 ENCOUNTER — APPOINTMENT (OUTPATIENT)
Dept: INTERNAL MEDICINE | Facility: CLINIC | Age: 73
End: 2023-08-10
Payer: MEDICARE

## 2023-08-10 DIAGNOSIS — G47.00 INSOMNIA, UNSPECIFIED: ICD-10-CM

## 2023-08-10 DIAGNOSIS — M17.11 UNILATERAL PRIMARY OSTEOARTHRITIS, RIGHT KNEE: ICD-10-CM

## 2023-08-10 PROCEDURE — 99214 OFFICE O/P EST MOD 30 MIN: CPT | Mod: 95

## 2023-08-10 RX ORDER — ZOLPIDEM TARTRATE 5 MG/1
5 TABLET ORAL
Qty: 15 | Refills: 0 | Status: ACTIVE | COMMUNITY
Start: 2023-08-10 | End: 1900-01-01

## 2023-08-10 NOTE — ASSESSMENT
[FreeTextEntry1] : Knee pain s/p arthroplasty: Opioids too sedating Discussed alternative NSAIDs, but prefers to stick with meloxicam as has not had GI side effects or blood pressure elevation.   Advised to decrease acetaminophen to no more than 4G/day. Will continue icing. Discussed gabapentin as and adjuvant Patient will reach out next week if pain not improving  Insomnia: Rx sent in for Ambien, low dose as needed Hopeful to not need refill once past this post-op period

## 2023-08-10 NOTE — HISTORY OF PRESENT ILLNESS
[Home] : at home, [unfilled] , at the time of the visit. [Medical Office: ([facility])___] : at the medical office located in  [Verbal consent obtained from patient] : the patient, [unfilled] [FreeTextEntry8] :  73 y.o. female, PMHx htn, hld, hypothyroid, ileitis, glaucoma, latent TB s/p INH x 6 months. s/p knee replacement 2 weeks ago.   Surgery went well.  Has not been tolerating opioid pain medication, made her too sleepy.  Now taking meloxicam 15mg and Tylenol (1,500 mg 4x/day), with moderate relief, wonders if anything else can help.  Tried Celebrex, but upset her stomach, tends to get elevated blood pressure with other NSAIDs.   Added in omeprazole for stomach protection.   With this her sleep has been very disturbed.  Ambien several years ago for a short period of time, did fine with it.   Will be starting PT in facility next week.  Icing the knee regularly as advised.

## 2023-09-13 ENCOUNTER — APPOINTMENT (OUTPATIENT)
Dept: INTERNAL MEDICINE | Facility: CLINIC | Age: 73
End: 2023-09-13
Payer: MEDICARE

## 2023-09-13 VITALS
DIASTOLIC BLOOD PRESSURE: 82 MMHG | RESPIRATION RATE: 14 BRPM | TEMPERATURE: 98 F | OXYGEN SATURATION: 98 % | HEART RATE: 71 BPM | HEIGHT: 62 IN | BODY MASS INDEX: 20.8 KG/M2 | WEIGHT: 113 LBS | SYSTOLIC BLOOD PRESSURE: 144 MMHG

## 2023-09-13 DIAGNOSIS — H91.93 UNSPECIFIED HEARING LOSS, BILATERAL: ICD-10-CM

## 2023-09-13 DIAGNOSIS — Z00.00 ENCOUNTER FOR GENERAL ADULT MEDICAL EXAMINATION W/OUT ABNORMAL FINDINGS: ICD-10-CM

## 2023-09-13 PROCEDURE — G0439: CPT

## 2023-09-13 RX ORDER — ENALAPRIL MALEATE 20 MG/1
20 TABLET ORAL DAILY
Qty: 90 | Refills: 3 | Status: ACTIVE | COMMUNITY
Start: 2022-07-14 | End: 1900-01-01

## 2023-09-13 RX ORDER — ROSUVASTATIN CALCIUM 10 MG/1
10 TABLET, FILM COATED ORAL
Qty: 90 | Refills: 3 | Status: ACTIVE | COMMUNITY
Start: 2022-03-23 | End: 1900-01-01

## 2023-09-13 RX ORDER — LEVOTHYROXINE SODIUM 0.05 MG/1
50 TABLET ORAL
Qty: 90 | Refills: 3 | Status: ACTIVE | COMMUNITY
Start: 1900-01-01 | End: 1900-01-01

## 2023-09-13 RX ORDER — MELOXICAM 15 MG/1
15 TABLET ORAL DAILY
Qty: 14 | Refills: 1 | Status: DISCONTINUED | COMMUNITY
Start: 2023-08-10 | End: 2023-09-13

## 2024-01-22 ENCOUNTER — RESULT REVIEW (OUTPATIENT)
Age: 74
End: 2024-01-22

## 2024-01-22 ENCOUNTER — APPOINTMENT (OUTPATIENT)
Dept: MAMMOGRAPHY | Facility: IMAGING CENTER | Age: 74
End: 2024-01-22
Payer: MEDICARE

## 2024-01-22 ENCOUNTER — OUTPATIENT (OUTPATIENT)
Dept: OUTPATIENT SERVICES | Facility: HOSPITAL | Age: 74
LOS: 1 days | End: 2024-01-22
Payer: MEDICARE

## 2024-01-22 ENCOUNTER — APPOINTMENT (OUTPATIENT)
Dept: ULTRASOUND IMAGING | Facility: IMAGING CENTER | Age: 74
End: 2024-01-22
Payer: MEDICARE

## 2024-01-22 DIAGNOSIS — Z98.89 OTHER SPECIFIED POSTPROCEDURAL STATES: Chronic | ICD-10-CM

## 2024-01-22 DIAGNOSIS — N63.20 UNSPECIFIED LUMP IN THE LEFT BREAST, UNSPECIFIED QUADRANT: ICD-10-CM

## 2024-01-22 DIAGNOSIS — N64.89 OTHER SPECIFIED DISORDERS OF BREAST: ICD-10-CM

## 2024-01-22 PROCEDURE — G0279: CPT | Mod: 26

## 2024-01-22 PROCEDURE — 76642 ULTRASOUND BREAST LIMITED: CPT

## 2024-01-22 PROCEDURE — G0279: CPT

## 2024-01-22 PROCEDURE — 76642 ULTRASOUND BREAST LIMITED: CPT | Mod: 26,50

## 2024-01-22 PROCEDURE — 77065 DX MAMMO INCL CAD UNI: CPT | Mod: 26,LT

## 2024-01-22 PROCEDURE — 77065 DX MAMMO INCL CAD UNI: CPT

## 2024-03-29 ENCOUNTER — RX RENEWAL (OUTPATIENT)
Age: 74
End: 2024-03-29

## 2024-03-29 RX ORDER — METOPROLOL SUCCINATE 25 MG/1
25 TABLET, EXTENDED RELEASE ORAL DAILY
Qty: 90 | Refills: 3 | Status: ACTIVE | COMMUNITY
Start: 2022-05-13 | End: 1900-01-01

## 2024-07-22 ENCOUNTER — APPOINTMENT (OUTPATIENT)
Dept: SPEECH THERAPY | Facility: CLINIC | Age: 74
End: 2024-07-22

## 2024-07-22 ENCOUNTER — OUTPATIENT (OUTPATIENT)
Dept: OUTPATIENT SERVICES | Facility: HOSPITAL | Age: 74
LOS: 1 days | Discharge: HOME | End: 2024-07-22

## 2024-07-22 DIAGNOSIS — Z98.89 OTHER SPECIFIED POSTPROCEDURAL STATES: Chronic | ICD-10-CM

## 2024-07-25 NOTE — HISTORY OF PRESENT ILLNESS
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 c/o difficulty hearing high frequency sounds however does not have significant difficulty hearing overall.

## 2024-07-25 NOTE — PROCEDURE
[226 Hz] : 226 Hz [Normal Eardrum Mobility] : consistent with normal eardrum mobility [Type A Tympanogram] : Type A Normal [] : Audiogram: [] : Complete Audiological Evaluation [Good] : good [Insert Ear Phones] : insert ear phones [de-identified] : hearing -4kHz followed by mild-severe hearing loss- conductive component noted at 2kHz [de-identified] : hearing -4kHz followed by moderate to moderately severe HL

## 2024-07-25 NOTE — ASSESSMENT
[FreeTextEntry1] : Results and recommendations discussed with patient, who expressed understanding.

## 2024-07-25 NOTE — PLAN
[FreeTextEntry2] : 1) Medical evaluation of results 2) Annual audiologic evaluation, sooner if a change in hearing is suspected.

## 2024-07-29 DIAGNOSIS — H90.5 UNSPECIFIED SENSORINEURAL HEARING LOSS: ICD-10-CM

## 2024-09-20 ENCOUNTER — APPOINTMENT (OUTPATIENT)
Dept: ULTRASOUND IMAGING | Facility: IMAGING CENTER | Age: 74
End: 2024-09-20
Payer: MEDICARE

## 2024-09-20 ENCOUNTER — RESULT REVIEW (OUTPATIENT)
Age: 74
End: 2024-09-20

## 2024-09-20 ENCOUNTER — OUTPATIENT (OUTPATIENT)
Dept: OUTPATIENT SERVICES | Facility: HOSPITAL | Age: 74
LOS: 1 days | End: 2024-09-20
Payer: MEDICARE

## 2024-09-20 ENCOUNTER — APPOINTMENT (OUTPATIENT)
Dept: MAMMOGRAPHY | Facility: IMAGING CENTER | Age: 74
End: 2024-09-20
Payer: MEDICARE

## 2024-09-20 DIAGNOSIS — R92.8 OTHER ABNORMAL AND INCONCLUSIVE FINDINGS ON DIAGNOSTIC IMAGING OF BREAST: ICD-10-CM

## 2024-09-20 DIAGNOSIS — Z98.89 OTHER SPECIFIED POSTPROCEDURAL STATES: Chronic | ICD-10-CM

## 2024-09-20 DIAGNOSIS — N64.89 OTHER SPECIFIED DISORDERS OF BREAST: ICD-10-CM

## 2024-09-20 PROCEDURE — 76641 ULTRASOUND BREAST COMPLETE: CPT | Mod: 26,50,GY

## 2024-09-20 PROCEDURE — 77067 SCR MAMMO BI INCL CAD: CPT | Mod: 26

## 2024-09-20 PROCEDURE — 76641 ULTRASOUND BREAST COMPLETE: CPT

## 2024-09-20 PROCEDURE — 77063 BREAST TOMOSYNTHESIS BI: CPT | Mod: 26

## 2024-09-20 PROCEDURE — 77067 SCR MAMMO BI INCL CAD: CPT

## 2024-09-20 PROCEDURE — 77063 BREAST TOMOSYNTHESIS BI: CPT

## 2024-10-22 ENCOUNTER — APPOINTMENT (OUTPATIENT)
Dept: INTERNAL MEDICINE | Facility: CLINIC | Age: 74
End: 2024-10-22
Payer: MEDICARE

## 2024-10-22 VITALS — DIASTOLIC BLOOD PRESSURE: 82 MMHG | SYSTOLIC BLOOD PRESSURE: 139 MMHG

## 2024-10-22 VITALS
SYSTOLIC BLOOD PRESSURE: 144 MMHG | OXYGEN SATURATION: 97 % | DIASTOLIC BLOOD PRESSURE: 81 MMHG | TEMPERATURE: 98.1 F | HEART RATE: 66 BPM | WEIGHT: 117 LBS | HEIGHT: 62 IN | BODY MASS INDEX: 21.53 KG/M2

## 2024-10-22 DIAGNOSIS — Z00.00 ENCOUNTER FOR GENERAL ADULT MEDICAL EXAMINATION W/OUT ABNORMAL FINDINGS: ICD-10-CM

## 2024-10-22 PROCEDURE — 36415 COLL VENOUS BLD VENIPUNCTURE: CPT

## 2024-10-22 PROCEDURE — G0439: CPT

## 2024-10-23 ENCOUNTER — RX RENEWAL (OUTPATIENT)
Age: 74
End: 2024-10-23

## 2024-10-23 LAB
ALBUMIN SERPL ELPH-MCNC: 4.5 G/DL
ALP BLD-CCNC: 44 U/L
ALT SERPL-CCNC: 18 U/L
ANION GAP SERPL CALC-SCNC: 13 MMOL/L
APPEARANCE: CLEAR
AST SERPL-CCNC: 21 U/L
BILIRUB SERPL-MCNC: 0.3 MG/DL
BILIRUBIN URINE: NEGATIVE
BLOOD URINE: NEGATIVE
BUN SERPL-MCNC: 15 MG/DL
CALCIUM SERPL-MCNC: 10 MG/DL
CHLORIDE SERPL-SCNC: 102 MMOL/L
CHOLEST SERPL-MCNC: 136 MG/DL
CO2 SERPL-SCNC: 25 MMOL/L
COLOR: YELLOW
CREAT SERPL-MCNC: 0.76 MG/DL
EGFR: 83 ML/MIN/1.73M2
ESTIMATED AVERAGE GLUCOSE: 117 MG/DL
GLUCOSE QUALITATIVE U: NEGATIVE MG/DL
GLUCOSE SERPL-MCNC: 109 MG/DL
HBA1C MFR BLD HPLC: 5.7 %
HCT VFR BLD CALC: 37.3 %
HDLC SERPL-MCNC: 65 MG/DL
HGB BLD-MCNC: 12.2 G/DL
KETONES URINE: NEGATIVE MG/DL
LDLC SERPL CALC-MCNC: 50 MG/DL
LEUKOCYTE ESTERASE URINE: NEGATIVE
MCHC RBC-ENTMCNC: 30 PG
MCHC RBC-ENTMCNC: 32.7 GM/DL
MCV RBC AUTO: 91.9 FL
NITRITE URINE: NEGATIVE
NONHDLC SERPL-MCNC: 71 MG/DL
PH URINE: 7.5
PLATELET # BLD AUTO: 293 K/UL
POTASSIUM SERPL-SCNC: 4.4 MMOL/L
PROT SERPL-MCNC: 7.2 G/DL
PROTEIN URINE: NEGATIVE MG/DL
RBC # BLD: 4.06 M/UL
RBC # FLD: 12 %
SODIUM SERPL-SCNC: 140 MMOL/L
SPECIFIC GRAVITY URINE: 1.01
TRIGL SERPL-MCNC: 122 MG/DL
TSH SERPL-ACNC: 1.96 UIU/ML
UROBILINOGEN URINE: 0.2 MG/DL
VIT B12 SERPL-MCNC: 968 PG/ML
WBC # FLD AUTO: 6.82 K/UL

## 2024-10-24 LAB — 25(OH)D3 SERPL-MCNC: 33.3 NG/ML

## 2024-12-25 PROBLEM — F10.90 ALCOHOL USE: Status: INACTIVE | Noted: 2021-03-13

## 2025-05-29 ENCOUNTER — NON-APPOINTMENT (OUTPATIENT)
Age: 75
End: 2025-05-29

## 2025-06-23 ENCOUNTER — APPOINTMENT (OUTPATIENT)
Dept: INTERNAL MEDICINE | Facility: CLINIC | Age: 75
End: 2025-06-23

## 2025-07-26 ENCOUNTER — EMERGENCY (EMERGENCY)
Facility: HOSPITAL | Age: 75
LOS: 1 days | End: 2025-07-26
Attending: EMERGENCY MEDICINE
Payer: MEDICARE

## 2025-07-26 VITALS
SYSTOLIC BLOOD PRESSURE: 146 MMHG | HEART RATE: 66 BPM | OXYGEN SATURATION: 100 % | TEMPERATURE: 98 F | RESPIRATION RATE: 18 BRPM | DIASTOLIC BLOOD PRESSURE: 85 MMHG

## 2025-07-26 VITALS
HEART RATE: 79 BPM | SYSTOLIC BLOOD PRESSURE: 177 MMHG | OXYGEN SATURATION: 98 % | WEIGHT: 113.98 LBS | RESPIRATION RATE: 20 BRPM | TEMPERATURE: 98 F | DIASTOLIC BLOOD PRESSURE: 111 MMHG | HEIGHT: 62 IN

## 2025-07-26 DIAGNOSIS — Z98.89 OTHER SPECIFIED POSTPROCEDURAL STATES: Chronic | ICD-10-CM

## 2025-07-26 DIAGNOSIS — R49.0 DYSPHONIA: ICD-10-CM

## 2025-07-26 LAB
ALBUMIN SERPL ELPH-MCNC: 4 G/DL — SIGNIFICANT CHANGE UP (ref 3.3–5)
ALP SERPL-CCNC: 31 U/L — LOW (ref 40–120)
ALT FLD-CCNC: 23 U/L — SIGNIFICANT CHANGE UP (ref 10–45)
ANION GAP SERPL CALC-SCNC: 11 MMOL/L — SIGNIFICANT CHANGE UP (ref 5–17)
AST SERPL-CCNC: 76 U/L — HIGH (ref 10–40)
BASOPHILS # BLD AUTO: 0.05 K/UL — SIGNIFICANT CHANGE UP (ref 0–0.2)
BASOPHILS NFR BLD AUTO: 0.4 % — SIGNIFICANT CHANGE UP (ref 0–2)
BILIRUB SERPL-MCNC: 0.3 MG/DL — SIGNIFICANT CHANGE UP (ref 0.2–1.2)
BUN SERPL-MCNC: 17 MG/DL — SIGNIFICANT CHANGE UP (ref 7–23)
CALCIUM SERPL-MCNC: 8.9 MG/DL — SIGNIFICANT CHANGE UP (ref 8.4–10.5)
CHLORIDE SERPL-SCNC: 102 MMOL/L — SIGNIFICANT CHANGE UP (ref 96–108)
CO2 SERPL-SCNC: 19 MMOL/L — LOW (ref 22–31)
CREAT SERPL-MCNC: 0.69 MG/DL — SIGNIFICANT CHANGE UP (ref 0.5–1.3)
EGFR: 91 ML/MIN/1.73M2 — SIGNIFICANT CHANGE UP
EGFR: 91 ML/MIN/1.73M2 — SIGNIFICANT CHANGE UP
EOSINOPHIL # BLD AUTO: 0.1 K/UL — SIGNIFICANT CHANGE UP (ref 0–0.5)
EOSINOPHIL NFR BLD AUTO: 0.8 % — SIGNIFICANT CHANGE UP (ref 0–6)
GLUCOSE SERPL-MCNC: 150 MG/DL — HIGH (ref 70–99)
HCT VFR BLD CALC: 35 % — SIGNIFICANT CHANGE UP (ref 34.5–45)
HGB BLD-MCNC: 11.5 G/DL — SIGNIFICANT CHANGE UP (ref 11.5–15.5)
IMM GRANULOCYTES # BLD AUTO: 0.05 K/UL — SIGNIFICANT CHANGE UP (ref 0–0.07)
IMM GRANULOCYTES NFR BLD AUTO: 0.4 % — SIGNIFICANT CHANGE UP (ref 0–0.9)
LYMPHOCYTES # BLD AUTO: 2.41 K/UL — SIGNIFICANT CHANGE UP (ref 1–3.3)
LYMPHOCYTES NFR BLD AUTO: 18.4 % — SIGNIFICANT CHANGE UP (ref 13–44)
MCHC RBC-ENTMCNC: 30.6 PG — SIGNIFICANT CHANGE UP (ref 27–34)
MCHC RBC-ENTMCNC: 32.9 G/DL — SIGNIFICANT CHANGE UP (ref 32–36)
MCV RBC AUTO: 93.1 FL — SIGNIFICANT CHANGE UP (ref 80–100)
MONOCYTES # BLD AUTO: 0.51 K/UL — SIGNIFICANT CHANGE UP (ref 0–0.9)
MONOCYTES NFR BLD AUTO: 3.9 % — SIGNIFICANT CHANGE UP (ref 2–14)
NEUTROPHILS # BLD AUTO: 9.99 K/UL — HIGH (ref 1.8–7.4)
NEUTROPHILS NFR BLD AUTO: 76.1 % — SIGNIFICANT CHANGE UP (ref 43–77)
NRBC # BLD AUTO: 0 K/UL — SIGNIFICANT CHANGE UP (ref 0–0)
NRBC # FLD: 0 K/UL — SIGNIFICANT CHANGE UP (ref 0–0)
NRBC BLD AUTO-RTO: 0 /100 WBCS — SIGNIFICANT CHANGE UP (ref 0–0)
PLATELET # BLD AUTO: 273 K/UL — SIGNIFICANT CHANGE UP (ref 150–400)
PMV BLD: 10.5 FL — SIGNIFICANT CHANGE UP (ref 7–13)
POTASSIUM SERPL-MCNC: SIGNIFICANT CHANGE UP MMOL/L (ref 3.5–5.3)
POTASSIUM SERPL-SCNC: SIGNIFICANT CHANGE UP MMOL/L (ref 3.5–5.3)
PROT SERPL-MCNC: 7.5 G/DL — SIGNIFICANT CHANGE UP (ref 6–8.3)
RBC # BLD: 3.76 M/UL — LOW (ref 3.8–5.2)
RBC # FLD: 12 % — SIGNIFICANT CHANGE UP (ref 10.3–14.5)
SODIUM SERPL-SCNC: 132 MMOL/L — LOW (ref 135–145)
WBC # BLD: 13.11 K/UL — HIGH (ref 3.8–10.5)
WBC # FLD AUTO: 13.11 K/UL — HIGH (ref 3.8–10.5)

## 2025-07-26 PROCEDURE — 99285 EMERGENCY DEPT VISIT HI MDM: CPT | Mod: GC

## 2025-07-26 PROCEDURE — 96374 THER/PROPH/DIAG INJ IV PUSH: CPT

## 2025-07-26 PROCEDURE — 80053 COMPREHEN METABOLIC PANEL: CPT

## 2025-07-26 PROCEDURE — 99284 EMERGENCY DEPT VISIT MOD MDM: CPT | Mod: 25

## 2025-07-26 PROCEDURE — 96372 THER/PROPH/DIAG INJ SC/IM: CPT | Mod: XU

## 2025-07-26 PROCEDURE — 31525 DX LARYNGOSCOPY EXCL NB: CPT

## 2025-07-26 PROCEDURE — 85025 COMPLETE CBC W/AUTO DIFF WBC: CPT

## 2025-07-26 RX ORDER — PREDNISONE 20 MG/1
2 TABLET ORAL
Qty: 10 | Refills: 0
Start: 2025-07-26 | End: 2025-07-30

## 2025-07-26 RX ADMIN — Medication 1000 MILLILITER(S): at 11:05

## 2025-07-26 RX ADMIN — Medication 0.3 MILLIGRAM(S): at 10:21

## 2025-07-26 RX ADMIN — Medication 20 MILLIGRAM(S): at 09:43

## 2025-07-26 RX ADMIN — Medication 10 MILLIGRAM(S): at 11:05

## 2025-07-26 NOTE — ED ADULT NURSE NOTE - OBJECTIVE STATEMENT
Pt presents to the ED BIBA A&Ox4 co allergic reaction soy milk today. Pt presents to the ED BIBA A&Ox4 co allergic reaction soy milk today. PMHx HTN. Pt states that she had soy milk this morning and subsequently experienced facial swelling and shortness of breath. En route, pt was give epi IM at 0910, duoneb x 1,decadron 10mg IV and benadryl 50mg IV. Upon arrival, pt speaking in complete sentences. Denies shortness of breath, chest pain, NV. Reports relief of symptoms. Pt placed on cardiac monitor and pulse ox monitoring at bedside.

## 2025-07-26 NOTE — CONSULT NOTE ADULT - ASSESSMENT
75-year-old female with history of hypertension brought in by EMS from home for concern for anaphylaxis. Patient works as infectious disease physician.  This morning was drinking soy milk smoothie around 8:30 AM, immediately after started developing facial swelling, periorbital swelling, hives around chest and neck, chest tightness with shortness of breath, wheezing.  Was given by EMS 0.3 IM epinephrine, Decadron 10 mg, Benadryl 50 mg at 9:10 AM.  With improvement of symptoms and now breathing is back to normal, still with facial swelling and mild hives around neck. Daughter does note voice change from baseline. Bedside flexible laryngoscopy revealed LPR, no evidence of laryngeal edema, airway widely patent.

## 2025-07-26 NOTE — ED PROVIDER NOTE - PATIENT PORTAL LINK FT
You can access the FollowMyHealth Patient Portal offered by [facility] by registering at the following website: [email or web address]. By joining [facility]’s FollowMyHealth portal, you will also be able to view your health information using other applications (apps) compatible with our system.

## 2025-07-26 NOTE — ED PROVIDER NOTE - PHYSICAL EXAMINATION
Vital signs reviewed.  CONSTITUTIONAL: NAD   HEAD: Normocephalic; atraumatic  EYES: EOMI, PERRL, periorbital swelling   MOUTH/THROAT:  MMM, no tongue or lip swelling   NECK: Trachea midline, no JVD   CV: Normal S1, S2; no audible murmurs  RESP: normal work of breathing; CTAB   ABD: soft, non-distended; non-tender to palpation   : Deferred  MSK/EXT: no LE edema, no limited ROM  SKIN: erythema over b/l neck   NEURO: Moves all extremities spontaneously with no focal deficits, speech is appropriate  PSYCH: calm interactive

## 2025-07-26 NOTE — ED PROVIDER NOTE - PROGRESS NOTE DETAILS
JUAN DIEGO Segovia MD PGY-3: Patient reassessed, bilateral hives around neck has resolved, facial swelling still present, as well as periorbital swelling.   currently at bedside states patient's voice is still very hoarse compared to baseline.  ENT consulted, will scope at bedside. JUAN DIEGO Segovia MD PGY-3: Patient reassessed, bilateral hives around neck has resolved, facial swelling still present, as well as periorbital swelling. Breathing is comfortable.   currently at bedside states patient's voice is still very hoarse compared to baseline.  ENT consulted, will scope at bedside. JUAN DIEGO Segovia MD PGY-3: Patient reassessed, bilateral hives around neck has resolved, facial swelling still present, as well as periorbital swelling. However feels "throat is closing up again, feeling tight". Denies SOB, No wheezing on exam.   currently at bedside states patient's voice is still very hoarse compared to baseline.  ENT consulted, will scope at bedside. Will give 2nd dose IM epi now JUAN DIEGO Segovia MD PGY-3: Spoke with son at bedside, son is a surgeon, would like to observe patient only for 4 hours.  Will be staying with the patient today for continued observation.  Will  EpiPen and steroid before discharge.

## 2025-07-26 NOTE — ED ADULT NURSE REASSESSMENT NOTE - NS ED NURSE REASSESS COMMENT FT1
Pt reports throat discomfort. ER providers aware. Administered 0.3 Epi IM to R anterolateral thigh as per provider orders. Pt remains on cardiac monitor at bedside. No respiratory distress noted.

## 2025-07-26 NOTE — ED PROVIDER NOTE - CLINICAL SUMMARY MEDICAL DECISION MAKING FREE TEXT BOX
75-year-old female with history of hypertension brought in by EMS from home for concern for anaphylaxis.  Patient works as infectious disease physician.  This morning was drinking soy milk smoothie around 8:30 AM, immediately after started developing facial swelling, periorbital swelling, hives around chest and neck, chest tightness with shortness of breath, wheezing.  Was given by EMS 0.3 IM epinephrine, Decadron 10 mg, Benadryl 50 mg at 9:10 AM.  With improvement of symptoms and now breathing is back to normal, still with facial swelling and mild hives around neck.  Daughter does note voice change from baseline.  Patient denies nausea, vomiting, diarrhea, abdominal pain.  Patient reports for the last 2 to 3 years has developed certain nut allergy.  Many years ago has had lip swelling to soy but never severe allergic reaction like this.  Currently takes lisinopril for hypertension has been on the same medication for a long time.  Vitals on presentation nonactionable.  Exam as above.  Presentation concerning for anaphylaxis from possible soy milk ingestion.    Less likely angioedema from ACE use. Will require 4-hour observation minimum.  Will give Pepcid in addition.  Dispo likely outpatient follow-up with immunology/allergy will prescribe EpiPen to go home with. 75-year-old female with history of hypertension brought in by EMS from home for concern for anaphylaxis.  Patient works as infectious disease physician.  This morning was drinking soy milk smoothie around 8:30 AM, immediately after started developing facial swelling, periorbital swelling, hives around chest and neck, chest tightness with shortness of breath, wheezing.  Was given by EMS 0.3 IM epinephrine, Decadron 10 mg, Benadryl 50 mg at 9:10 AM.  With improvement of symptoms and now breathing is back to normal, still with facial swelling and mild hives around neck.  Daughter does note voice change from baseline.  Patient denies nausea, vomiting, diarrhea, abdominal pain.  Patient reports for the last 2 to 3 years has developed certain nut allergy.  Many years ago has had lip swelling to soy but never severe allergic reaction like this.  Currently takes enalapril for hypertension has been on the same medication for a long time.  Vitals on presentation nonactionable.  Exam as above.  Presentation concerning for anaphylaxis from possible soy milk ingestion.    Less likely angioedema from ACEi use. Will require 4-hour observation minimum.  Will give Pepcid in addition.  Will reassess clinically for improvement. Dispo likely outpatient follow-up with immunology/allergy will prescribe EpiPen to go home with. 75-year-old female with history of hypertension brought in by EMS from home for concern for anaphylaxis.    Patient works as infectious disease physician.  This morning was drinking soy milk smoothie around 8:30 AM, immediately after started developing facial swelling, periorbital swelling, hives around chest and neck, chest tightness with shortness of breath, wheezing.  Was given by EMS 0.3 IM epinephrine, Decadron 10 mg, Benadryl 50 mg at 9:10 AM.  With improvement of symptoms and now breathing is back to normal, still with facial swelling and mild hives around neck.    Daughter does note voice change from baseline.  Patient denies nausea, vomiting, diarrhea, abdominal pain.  Patient reports for the last 2 to 3 years has developed certain nut allergy.  Many years ago has had lip swelling to soy but never severe allergic reaction like this.  Currently takes enalapril for hypertension has been on the same medication for a long time.  Vitals on presentation non actionable.  Exam as above.  Presentation concerning for anaphylaxis from possible soy milk ingestion.    Less likely angioedema from ACEi use. Will require 4-hour observation minimum.  Will give Pepcid in addition.  Will reassess clinically for improvement. Dispo likely outpatient follow-up with immunology/allergy will prescribe EpiPen to go home with.ZR 75-year-old female with history of hypertension brought in by EMS from home for concern for anaphylaxis.    Patient works as infectious disease physician.  This morning was drinking soy milk smoothie around 8:30 AM, immediately after started developing facial swelling, periorbital swelling, hives around chest and neck, chest tightness with shortness of breath, wheezing.  Was given by EMS 0.3 IM epinephrine, Decadron 10 mg, Benadryl 50 mg at 9:10 AM.  With improvement of symptoms and now breathing is back to normal, still with facial swelling and mild hives around neck.    Daughter does note voice change from baseline.  Patient denies nausea, vomiting, diarrhea, abdominal pain.  Patient reports for the last 2 to 3 years has developed certain nut allergy.  Many years ago has had lip swelling to soy but never severe allergic reaction like this.  Currently takes enalapril for hypertension has been on the same medication for a long time.  Vitals on presentation non actionable.  Exam as above.  Presentation concern Zertec  c .  Will reassess clinically for improvement. Dispo likely outpatient follow-up with immunology/allergy will prescribe EpiPen to go home with.ZR

## 2025-07-26 NOTE — ED PROVIDER NOTE - NSPTACCESSSVCSAPPTDETAILS_ED_ALL_ED_FT
Please assist with allergy/immunology follow-up in 1 to 2-week of discharge for anaphylaxis reaction to possibly soy milk.

## 2025-07-26 NOTE — ED ADULT TRIAGE NOTE - CHIEF COMPLAINT QUOTE
allergic reaction to soy milk - SOB and swollen eyes  received 10mg decadron, duoneb, epi and benadryl via EMS prior to arrival.

## 2025-07-26 NOTE — ED PROVIDER NOTE - NSFOLLOWUPCLINICS_GEN_ALL_ED_FT
[facility]  Allergy  [address] [place] [address]  Phone: [phone]  Fax:   Follow Up Time: 7-10 Days

## 2025-07-26 NOTE — ED ADULT TRIAGE NOTE - WEIGHT IN LBS
113.9
Detail Level: Simple
Instructions: This plan will send the code FBSD to the PM system.  DO NOT or CHANGE the price.
Price (Do Not Change): 0.00

## 2025-07-26 NOTE — CONSULT NOTE ADULT - SUBJECTIVE AND OBJECTIVE BOX
CC: Hoarseness    HPI: 75-year-old female with history of hypertension brought in by EMS from home for concern for anaphylaxis. Patient works as infectious disease physician.  This morning was drinking soy milk smoothie around 8:30 AM, immediately after started developing facial swelling, periorbital swelling, hives around chest and neck, chest tightness with shortness of breath, wheezing.  Was given by EMS 0.3 IM epinephrine, Decadron 10 mg, Benadryl 50 mg at 9:10 AM.  With improvement of symptoms and now breathing is back to normal, still with facial swelling and mild hives around neck. Daughter does note voice change from baseline. Pt denies SOB, cough, sore throat, dysphagia.        PAST MEDICAL & SURGICAL HISTORY:  Hypertension      History of  section        Allergies    No Known Allergies    Intolerances      MEDICATIONS  (STANDING):    MEDICATIONS  (PRN):      Social History: no tobacco use    Family history: no pertinent FHx    ROS:   ENT: all negative except as noted in HPI   CV: denies palpitations  Pulm: denies SOB, cough, hemoptysis  GI: denies change in apetite, indigestion, n/v  : denies pertinent urinary symptoms, urgency  Neuro: denies numbness/tingling, loss of sensation  Psych: denies anxiety  MS: denies muscle weakness, instability  Heme: denies easy bruising or bleeding  Endo: denies heat/cold intolerance, excessive sweating  Vascular: denies LE edema    Vital Signs Last 24 Hrs  T(C): 36.7 (2025 13:06), Max: 36.9 (2025 09:17)  T(F): 98.1 (2025 13:06), Max: 98.5 (2025 09:17)  HR: 66 (2025 13:06) (66 - 79)  BP: 146/85 (2025 13:06) (146/85 - 177/111)  BP(mean): --  RR: 18 (2025 13:06) (18 - 21)  SpO2: 100% (2025 13:06) (96% - 100%)    Parameters below as of 2025 13:06  Patient On (Oxygen Delivery Method): room air                              11.5   13.11 )-----------( 273      ( 2025 11:24 )             35.0    07-    132[L]  |  102  |  17  ----------------------------<  150[H]  See Note   |  19[L]  |  0.69    Ca    8.9      2025 11:24    TPro  7.5  /  Alb  4.0  /  TBili  0.3  /  DBili  x   /  AST  76[H]  /  ALT  23  /  AlkPhos  31[L]         PHYSICAL EXAM:  Gen: NAD  Skin: No rashes, bruises, or lesions  Head: Normocephalic, Atraumatic  Face: no edema, erythema, or fluctuance. Parotid glands soft without mass  Eyes: no scleral injection  Nose: Nares bilaterally patent, no discharge  Mouth: No Stridor / Drooling / Trismus.  Mucosa moist, tongue/uvula midline, oropharynx clear  Neck: Flat, supple, no lymphadenopathy, trachea midline, no masses  Lymphatic: No lymphadenopathy  Resp: breathing easily, no stridor  CV: no peripheral edema/cyanosis  GI: nondistended   Peripheral vascular: no JVD or edema  Neuro: facial nerve intact, no facial droop      Fiberoptic Indirect laryngoscopy:  (Scope #2 used)    Reason for Laryngoscopy:    Patient was unable to cooperate with mirror.  Nasopharynx, oropharynx, and hypopharynx clear, no bleeding. Tongue base, posterior pharyngeal wall, vallecula, epiglottis, and subglottis appear normal. No erythema, edema, pooling of secretions, masses or lesions. Airway patent, no foreign body visualized. No glottic/supraglottic edema. True vocal cords, arytenoids, vestibular folds, ventricles, pyriform sinuses, and aryepiglottic folds appear normal bilaterally. Vocal cords mobile with good contact b/l.

## 2025-07-26 NOTE — ED PROVIDER NOTE - NSFOLLOWUPINSTRUCTIONS_ED_ALL_ED_FT
What is anaphylaxis?    This is a serious allergic reaction. It can cause serious symptoms, including swelling, trouble breathing, and fainting. Anaphylaxis can happen very quickly and can lead to death if not treated right away.    Anaphylaxis can happen after a person:    ?Eats a food they are allergic to    ?Takes a medicine they are allergic to    ?Is stung by an insect they are allergic to    Sometimes, anaphylaxis happens without an obvious cause.    Anaphylaxis is treated with a medicine called "epinephrine." People who had anaphylaxis usually get a prescription for epinephrine. It comes in 2 forms:    ?A device called an "autoinjector" (sample brand names: Auvi-Q, EpiPen), which has a shot you can give yourself (figure 1)    ?A spray that goes in the nose (brand name: neffy) (figure 2)    How can I care for myself at home?    Ask the doctor or nurse what you should do when you go home. Make sure you understand exactly what you need to do to care for yourself. Ask questions if there is anything you do not understand.    You should also:    ?Get plenty of rest over the next few days, if possible.    ?Avoid foods, medicines, or insects you know you are allergic to. If you don't know what caused your allergic reaction, work with an allergist (allergy doctor) to try to figure it out.    If the doctor prescribed epinephrine:    ?Fill your prescription as soon as possible.    ?Make sure you know how and when to use it.    ?Keep it with you at all times. Most doctors recommend carrying 2 doses in case you need a second dose. You might need a second dose if:    •Your symptoms do not go away completely.    •Your symptoms go away, but then start to come back.    •You used an autoinjector that did not work correctly the first time.    If you also have mild allergy symptoms, like itchy skin or eyes, you can use over-the-counter medicines to help with these.    What follow-up care do I need?    Your doctor or nurse will tell you if you need to make a follow-up appointment with an allergist. If so, make sure you know when and where to go.    When should I call the doctor?    Your doctor or nurse will give you instructions on what symptoms to watch for and when to call for help. This is called an "action plan." Make sure you are familiar with your action plan.    Call for an ambulance (in the [place] and [place], call 9-1-1):    ?If your anaphylaxis symptoms come back. These might include:    •Having trouble breathing, wheezing, or a cough that won't stop    •Feeling like your throat is closing, or your lips or tongue are swelling    •Feeling very weak or dizzy, or faint    ?After you use epinephrine – The medicine does not last very long in the body. Symptoms can sometimes come back as the medicine wears off. In the hospital, doctors can watch you and give you more medicine if needed.    Call your doctor or nurse for advice if:    ?You have milder symptoms that do not improve after a few days or get worse – Examples include a rash, congestion, or swelling of the face or lips.    ?You have questions about epinephrine or how to manage your allergy.    Please follow-up with your primary care physician within 3 to 5 days of discharge.    Please use EpiPen if you were to experience severe allergic reaction again as instructed.

## 2025-07-26 NOTE — ED ADULT TRIAGE NOTE - HEIGHT IN INCHES
Spoke to pt.  She has sick family member may have to reschedule her colonoscopy.  Wants to try and keep it on 6/21/18.  She will know more by 6/18/18. She will call back to discuss then.  Will hold time for 7/26/18 until we talk again 6/18/18   2

## 2025-09-15 ENCOUNTER — APPOINTMENT (OUTPATIENT)
Dept: ULTRASOUND IMAGING | Facility: IMAGING CENTER | Age: 75
End: 2025-09-15
Payer: MEDICARE

## 2025-09-15 ENCOUNTER — APPOINTMENT (OUTPATIENT)
Dept: MAMMOGRAPHY | Facility: IMAGING CENTER | Age: 75
End: 2025-09-15
Payer: MEDICARE

## 2025-09-15 ENCOUNTER — RESULT REVIEW (OUTPATIENT)
Age: 75
End: 2025-09-15

## 2025-09-15 PROCEDURE — 77067 SCR MAMMO BI INCL CAD: CPT | Mod: 26

## 2025-09-15 PROCEDURE — 77063 BREAST TOMOSYNTHESIS BI: CPT | Mod: 26

## 2025-09-15 PROCEDURE — 76641 ULTRASOUND BREAST COMPLETE: CPT | Mod: 26,50,GA
